# Patient Record
Sex: FEMALE | Race: WHITE | NOT HISPANIC OR LATINO | Employment: OTHER | URBAN - METROPOLITAN AREA
[De-identification: names, ages, dates, MRNs, and addresses within clinical notes are randomized per-mention and may not be internally consistent; named-entity substitution may affect disease eponyms.]

---

## 2022-07-10 ENCOUNTER — OFFICE VISIT (OUTPATIENT)
Dept: URGENT CARE | Facility: PHYSICIAN GROUP | Age: 82
End: 2022-07-10
Payer: MEDICARE

## 2022-07-10 VITALS
SYSTOLIC BLOOD PRESSURE: 124 MMHG | TEMPERATURE: 98.2 F | HEART RATE: 86 BPM | OXYGEN SATURATION: 97 % | RESPIRATION RATE: 16 BRPM | BODY MASS INDEX: 14.91 KG/M2 | DIASTOLIC BLOOD PRESSURE: 66 MMHG | HEIGHT: 67 IN | WEIGHT: 95 LBS

## 2022-07-10 DIAGNOSIS — U07.1 COVID-19 VIRUS INFECTION: ICD-10-CM

## 2022-07-10 LAB
EXTERNAL QUALITY CONTROL: ABNORMAL
SARS-COV+SARS-COV-2 AG RESP QL IA.RAPID: POSITIVE

## 2022-07-10 PROCEDURE — 99203 OFFICE O/P NEW LOW 30 MIN: CPT | Mod: CS | Performed by: FAMILY MEDICINE

## 2022-07-10 PROCEDURE — 87426 SARSCOV CORONAVIRUS AG IA: CPT | Performed by: FAMILY MEDICINE

## 2022-07-10 ASSESSMENT — ENCOUNTER SYMPTOMS
COUGH: 0
VOMITING: 0
ABDOMINAL PAIN: 0
FEVER: 0
SORE THROAT: 0
HEADACHES: 0
SHORTNESS OF BREATH: 0

## 2022-07-10 NOTE — PROGRESS NOTES
"Subjective:     Jacqueline Rodriguez is a 82 y.o. female who presents for Coronavirus Screening (Positive @ Home test. No symptoms at the moment wants covid test redone)    HPI  Pt presents for evaluation of an acute problem  Patient with positive COVID-19 testing at home  Patient's son recently tested positive for COVID-19 and they are living together  Patient currently asymptomatic, however daughter is concerned about the test  Wants repeat test and possible treatment if positive  Patient has no other acute complaints today    Review of Systems   Constitutional: Negative for fever.   HENT: Negative for sore throat.    Respiratory: Negative for cough and shortness of breath.    Gastrointestinal: Negative for abdominal pain and vomiting.   Skin: Negative for rash.   Neurological: Negative for headaches.       PMH:  has no past medical history on file.  MEDS: No current outpatient medications on file.  ALLERGIES: No Known Allergies  SURGHX: History reviewed. No pertinent surgical history.  SOCHX:  reports that she has never smoked. She has never used smokeless tobacco. She reports previous alcohol use. She reports that she does not use drugs.     Objective:   /66 (BP Location: Left arm, Patient Position: Sitting, BP Cuff Size: Adult)   Pulse 86   Temp 36.8 °C (98.2 °F) (Temporal)   Resp 16   Ht 1.702 m (5' 7\")   Wt 43.1 kg (95 lb) Comment: pt provided in wheelchair  SpO2 97%   BMI 14.88 kg/m²     Physical Exam  Constitutional:       General: She is not in acute distress.     Appearance: She is well-developed. She is not diaphoretic.   HENT:      Head: Normocephalic and atraumatic.      Nose: Nose normal.      Mouth/Throat:      Mouth: Mucous membranes are moist.      Pharynx: Oropharynx is clear. No oropharyngeal exudate or posterior oropharyngeal erythema.   Neck:      Trachea: No tracheal deviation.   Cardiovascular:      Rate and Rhythm: Normal rate and regular rhythm.   Pulmonary:      Effort: Pulmonary " effort is normal. No respiratory distress.      Breath sounds: Normal breath sounds. No wheezing or rales.   Musculoskeletal:      Cervical back: Normal range of motion and neck supple. No tenderness.   Lymphadenopathy:      Cervical: No cervical adenopathy.   Skin:     General: Skin is warm and dry.      Findings: No rash.   Neurological:      Mental Status: She is alert.         Assessment/Plan:   Assessment    1. COVID-19 virus infection  - POCT SARS-COV Antigen KAIN (Symptomatic only)  - Nirmatrelvir & Ritonavir 20 x 150 MG & 10 x 100MG Tablet Therapy Pack; Take 300 mg nirmatrelvir (two 150 mg tablets) with 100 mg  ritonavir (one 100 mg tablet) by mouth, with all three tablets taken together  twice daily for 5 days.  Dispense: 30 Each; Refill: 0    Patient with COVID-19 positive test result.  She is currently asymptomatic.  Due to her age and frailty, did recommend empiric treatment to prevent severe symptoms.  Daughter agreeable.  Last GFR in the 70s.  Treated with Paxlovid.  F/U in urgent care as needed.

## 2022-07-13 PROBLEM — F33.9 MAJOR DEPRESSION, RECURRENT, CHRONIC (HCC): Status: ACTIVE | Noted: 2022-07-13

## 2022-07-13 PROBLEM — E53.8 VITAMIN B 12 DEFICIENCY: Status: ACTIVE | Noted: 2022-07-13

## 2022-07-13 PROBLEM — R41.81 AGE-RELATED COGNITIVE DECLINE: Status: ACTIVE | Noted: 2022-07-13

## 2022-07-13 PROBLEM — Z20.1 H/O EXPOSURE TO TUBERCULOSIS: Status: ACTIVE | Noted: 2022-07-13

## 2022-07-13 PROBLEM — E55.9 VITAMIN D DEFICIENCY: Status: ACTIVE | Noted: 2022-07-13

## 2022-07-15 ENCOUNTER — HOSPITAL ENCOUNTER (OUTPATIENT)
Facility: MEDICAL CENTER | Age: 82
End: 2022-07-15
Attending: NURSE PRACTITIONER
Payer: MEDICARE

## 2022-07-15 DIAGNOSIS — Z20.1 H/O EXPOSURE TO TUBERCULOSIS: ICD-10-CM

## 2022-07-15 DIAGNOSIS — F33.9 MAJOR DEPRESSION, RECURRENT, CHRONIC (HCC): ICD-10-CM

## 2022-07-15 DIAGNOSIS — E53.8 VITAMIN B 12 DEFICIENCY: ICD-10-CM

## 2022-07-15 DIAGNOSIS — R41.81 AGE-RELATED COGNITIVE DECLINE: ICD-10-CM

## 2022-07-15 DIAGNOSIS — E55.9 VITAMIN D DEFICIENCY: ICD-10-CM

## 2022-07-15 PROCEDURE — 85025 COMPLETE CBC W/AUTO DIFF WBC: CPT

## 2022-07-15 PROCEDURE — 82306 VITAMIN D 25 HYDROXY: CPT

## 2022-07-15 PROCEDURE — 86480 TB TEST CELL IMMUN MEASURE: CPT

## 2022-07-15 PROCEDURE — 82607 VITAMIN B-12: CPT

## 2022-07-15 PROCEDURE — 80053 COMPREHEN METABOLIC PANEL: CPT

## 2022-07-16 LAB
25(OH)D3 SERPL-MCNC: 54 NG/ML (ref 30–100)
ALBUMIN SERPL BCP-MCNC: 4.4 G/DL (ref 3.2–4.9)
ALBUMIN/GLOB SERPL: 2.3 G/DL
ALP SERPL-CCNC: 76 U/L (ref 30–99)
ALT SERPL-CCNC: 16 U/L (ref 2–50)
ANION GAP SERPL CALC-SCNC: 12 MMOL/L (ref 7–16)
AST SERPL-CCNC: 21 U/L (ref 12–45)
BASOPHILS # BLD AUTO: 0.4 % (ref 0–1.8)
BASOPHILS # BLD: 0.03 K/UL (ref 0–0.12)
BILIRUB SERPL-MCNC: 0.4 MG/DL (ref 0.1–1.5)
BUN SERPL-MCNC: 31 MG/DL (ref 8–22)
CALCIUM SERPL-MCNC: 9.9 MG/DL (ref 8.5–10.5)
CHLORIDE SERPL-SCNC: 102 MMOL/L (ref 96–112)
CO2 SERPL-SCNC: 24 MMOL/L (ref 20–33)
CREAT SERPL-MCNC: 0.53 MG/DL (ref 0.5–1.4)
EOSINOPHIL # BLD AUTO: 0.07 K/UL (ref 0–0.51)
EOSINOPHIL NFR BLD: 1 % (ref 0–6.9)
ERYTHROCYTE [DISTWIDTH] IN BLOOD BY AUTOMATED COUNT: 53.1 FL (ref 35.9–50)
GFR SERPLBLD CREATININE-BSD FMLA CKD-EPI: 93 ML/MIN/1.73 M 2
GLOBULIN SER CALC-MCNC: 1.9 G/DL (ref 1.9–3.5)
GLUCOSE SERPL-MCNC: 104 MG/DL (ref 65–99)
HCT VFR BLD AUTO: 44.4 % (ref 37–47)
HGB BLD-MCNC: 13.9 G/DL (ref 12–16)
IMM GRANULOCYTES # BLD AUTO: 0.04 K/UL (ref 0–0.11)
IMM GRANULOCYTES NFR BLD AUTO: 0.6 % (ref 0–0.9)
LYMPHOCYTES # BLD AUTO: 0.94 K/UL (ref 1–4.8)
LYMPHOCYTES NFR BLD: 13 % (ref 22–41)
MCH RBC QN AUTO: 30.7 PG (ref 27–33)
MCHC RBC AUTO-ENTMCNC: 31.3 G/DL (ref 33.6–35)
MCV RBC AUTO: 98 FL (ref 81.4–97.8)
MONOCYTES # BLD AUTO: 0.63 K/UL (ref 0–0.85)
MONOCYTES NFR BLD AUTO: 8.7 % (ref 0–13.4)
NEUTROPHILS # BLD AUTO: 5.5 K/UL (ref 2–7.15)
NEUTROPHILS NFR BLD: 76.3 % (ref 44–72)
NRBC # BLD AUTO: 0 K/UL
NRBC BLD-RTO: 0 /100 WBC
PLATELET # BLD AUTO: 260 K/UL (ref 164–446)
PMV BLD AUTO: 10.9 FL (ref 9–12.9)
POTASSIUM SERPL-SCNC: 4 MMOL/L (ref 3.6–5.5)
PROT SERPL-MCNC: 6.3 G/DL (ref 6–8.2)
RBC # BLD AUTO: 4.53 M/UL (ref 4.2–5.4)
SODIUM SERPL-SCNC: 138 MMOL/L (ref 135–145)
VIT B12 SERPL-MCNC: 914 PG/ML (ref 211–911)
WBC # BLD AUTO: 7.2 K/UL (ref 4.8–10.8)

## 2022-07-18 LAB
GAMMA INTERFERON BACKGROUND BLD IA-ACNC: 0.07 IU/ML
M TB IFN-G BLD-IMP: NEGATIVE
M TB IFN-G CD4+ BCKGRND COR BLD-ACNC: -0.01 IU/ML
MITOGEN IGNF BCKGRD COR BLD-ACNC: 6.58 IU/ML
QFT TB2 - NIL TBQ2: -0.03 IU/ML

## 2022-07-22 PROBLEM — Z99.3 WHEELCHAIR BOUND: Status: ACTIVE | Noted: 2022-07-21

## 2022-07-22 PROBLEM — R13.19 OTHER DYSPHAGIA: Status: ACTIVE | Noted: 2022-07-21
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
## 2022-07-26 ENCOUNTER — HOSPITAL ENCOUNTER (OUTPATIENT)
Facility: MEDICAL CENTER | Age: 82
End: 2022-07-26
Attending: NURSE PRACTITIONER
Payer: MEDICARE

## 2022-07-26 DIAGNOSIS — R39.9 UTI SYMPTOMS: ICD-10-CM

## 2022-07-26 LAB
APPEARANCE UR: CLEAR
BACTERIA #/AREA URNS HPF: ABNORMAL /HPF
BILIRUB UR QL STRIP.AUTO: NEGATIVE
COLOR UR: YELLOW
EPI CELLS #/AREA URNS HPF: ABNORMAL /HPF
GLUCOSE UR STRIP.AUTO-MCNC: NEGATIVE MG/DL
KETONES UR STRIP.AUTO-MCNC: ABNORMAL MG/DL
LEUKOCYTE ESTERASE UR QL STRIP.AUTO: ABNORMAL
MICRO URNS: ABNORMAL
NITRITE UR QL STRIP.AUTO: POSITIVE
PH UR STRIP.AUTO: 6 [PH] (ref 5–8)
PROT UR QL STRIP: NEGATIVE MG/DL
RBC # URNS HPF: ABNORMAL /HPF
RBC UR QL AUTO: ABNORMAL
SP GR UR STRIP.AUTO: 1.02
UROBILINOGEN UR STRIP.AUTO-MCNC: 1 MG/DL
WBC #/AREA URNS HPF: ABNORMAL /HPF

## 2022-07-26 PROCEDURE — 87077 CULTURE AEROBIC IDENTIFY: CPT

## 2022-07-26 PROCEDURE — 87086 URINE CULTURE/COLONY COUNT: CPT

## 2022-07-26 PROCEDURE — 81001 URINALYSIS AUTO W/SCOPE: CPT

## 2022-07-26 PROCEDURE — 87186 SC STD MICRODIL/AGAR DIL: CPT

## 2022-07-28 LAB
BACTERIA UR CULT: ABNORMAL
BACTERIA UR CULT: ABNORMAL
SIGNIFICANT IND 70042: ABNORMAL
SITE SITE: ABNORMAL
SOURCE SOURCE: ABNORMAL

## 2022-09-14 PROBLEM — R64 CACHEXIA (HCC): Status: ACTIVE | Noted: 2022-08-31

## 2022-09-14 PROBLEM — E43 SEVERE PROTEIN-CALORIE MALNUTRITION (HCC): Status: ACTIVE | Noted: 2022-07-07

## 2022-11-19 PROBLEM — K08.9 POOR DENTITION: Status: ACTIVE | Noted: 2022-11-15

## 2022-12-22 ENCOUNTER — HOSPITAL ENCOUNTER (OUTPATIENT)
Facility: MEDICAL CENTER | Age: 82
End: 2022-12-22
Attending: NURSE PRACTITIONER
Payer: MEDICARE

## 2022-12-22 DIAGNOSIS — R39.9 UTI SYMPTOMS: ICD-10-CM

## 2022-12-22 PROCEDURE — 81001 URINALYSIS AUTO W/SCOPE: CPT

## 2022-12-22 PROCEDURE — 87086 URINE CULTURE/COLONY COUNT: CPT

## 2022-12-22 PROCEDURE — 87186 SC STD MICRODIL/AGAR DIL: CPT

## 2022-12-22 PROCEDURE — 87077 CULTURE AEROBIC IDENTIFY: CPT

## 2022-12-23 LAB
APPEARANCE UR: ABNORMAL
BACTERIA #/AREA URNS HPF: ABNORMAL /HPF
BILIRUB UR QL STRIP.AUTO: NEGATIVE
COLOR UR: YELLOW
EPI CELLS #/AREA URNS HPF: ABNORMAL /HPF
GLUCOSE UR STRIP.AUTO-MCNC: NEGATIVE MG/DL
GRAN CASTS #/AREA URNS LPF: ABNORMAL /LPF
HYALINE CASTS #/AREA URNS LPF: ABNORMAL /LPF
KETONES UR STRIP.AUTO-MCNC: NEGATIVE MG/DL
LEUKOCYTE ESTERASE UR QL STRIP.AUTO: ABNORMAL
MICRO URNS: ABNORMAL
NITRITE UR QL STRIP.AUTO: POSITIVE
PH UR STRIP.AUTO: 6 [PH] (ref 5–8)
PROT UR QL STRIP: NEGATIVE MG/DL
RBC # URNS HPF: ABNORMAL /HPF
RBC UR QL AUTO: ABNORMAL
SP GR UR STRIP.AUTO: 1.02
URATE CRY #/AREA URNS HPF: POSITIVE /HPF
UROBILINOGEN UR STRIP.AUTO-MCNC: 0.2 MG/DL
WBC #/AREA URNS HPF: ABNORMAL /HPF

## 2023-01-16 PROBLEM — N30.01 ACUTE CYSTITIS WITH HEMATURIA: Status: ACTIVE | Noted: 2023-01-16

## 2023-01-27 ENCOUNTER — HOSPITAL ENCOUNTER (OUTPATIENT)
Facility: MEDICAL CENTER | Age: 83
End: 2023-01-27
Attending: NURSE PRACTITIONER
Payer: MEDICARE

## 2023-01-27 DIAGNOSIS — N30.01 ACUTE CYSTITIS WITH HEMATURIA: ICD-10-CM

## 2023-01-27 PROCEDURE — 87086 URINE CULTURE/COLONY COUNT: CPT

## 2023-01-27 PROCEDURE — 87186 SC STD MICRODIL/AGAR DIL: CPT

## 2023-01-27 PROCEDURE — 87077 CULTURE AEROBIC IDENTIFY: CPT

## 2023-01-27 PROCEDURE — 81001 URINALYSIS AUTO W/SCOPE: CPT

## 2023-01-28 LAB
APPEARANCE UR: ABNORMAL
BACTERIA #/AREA URNS HPF: ABNORMAL /HPF
BILIRUB UR QL STRIP.AUTO: NEGATIVE
COLOR UR: YELLOW
EPI CELLS #/AREA URNS HPF: ABNORMAL /HPF
GLUCOSE UR STRIP.AUTO-MCNC: NEGATIVE MG/DL
HYALINE CASTS #/AREA URNS LPF: ABNORMAL /LPF
KETONES UR STRIP.AUTO-MCNC: NEGATIVE MG/DL
LEUKOCYTE ESTERASE UR QL STRIP.AUTO: ABNORMAL
MICRO URNS: ABNORMAL
NITRITE UR QL STRIP.AUTO: NEGATIVE
PH UR STRIP.AUTO: 7 [PH] (ref 5–8)
PROT UR QL STRIP: NEGATIVE MG/DL
RBC # URNS HPF: ABNORMAL /HPF
RBC UR QL AUTO: ABNORMAL
SP GR UR STRIP.AUTO: 1.02
UROBILINOGEN UR STRIP.AUTO-MCNC: 0.2 MG/DL
WBC #/AREA URNS HPF: ABNORMAL /HPF

## 2023-04-25 PROBLEM — N39.0 RECURRENT UTI: Status: ACTIVE | Noted: 2023-04-24

## 2023-06-05 PROBLEM — R29.898 WEAKNESS OF BOTH LOWER EXTREMITIES: Status: ACTIVE | Noted: 2023-06-05

## 2023-06-05 PROBLEM — R54 FRAILTY: Status: ACTIVE | Noted: 2023-06-05

## 2023-06-05 PROBLEM — R26.81 UNSTEADY GAIT: Status: ACTIVE | Noted: 2023-06-05

## 2023-06-05 PROBLEM — H54.7 VISION IMPAIRMENT: Status: ACTIVE | Noted: 2023-06-05

## 2023-06-15 ENCOUNTER — HOSPITAL ENCOUNTER (OUTPATIENT)
Facility: MEDICAL CENTER | Age: 83
End: 2023-06-15
Attending: NURSE PRACTITIONER
Payer: MEDICARE

## 2023-06-15 DIAGNOSIS — Z20.1 HISTORY OF TUBERCULOSIS EXPOSURE: ICD-10-CM

## 2023-06-15 DIAGNOSIS — R64 CACHEXIA (HCC): ICD-10-CM

## 2023-06-15 LAB
BASOPHILS # BLD AUTO: 0.5 % (ref 0–1.8)
BASOPHILS # BLD: 0.03 K/UL (ref 0–0.12)
EOSINOPHIL # BLD AUTO: 0.11 K/UL (ref 0–0.51)
EOSINOPHIL NFR BLD: 1.9 % (ref 0–6.9)
ERYTHROCYTE [DISTWIDTH] IN BLOOD BY AUTOMATED COUNT: 50.5 FL (ref 35.9–50)
HCT VFR BLD AUTO: 47.7 % (ref 37–47)
HGB BLD-MCNC: 15.4 G/DL (ref 12–16)
IMM GRANULOCYTES # BLD AUTO: 0.01 K/UL (ref 0–0.11)
IMM GRANULOCYTES NFR BLD AUTO: 0.2 % (ref 0–0.9)
LYMPHOCYTES # BLD AUTO: 1.09 K/UL (ref 1–4.8)
LYMPHOCYTES NFR BLD: 18.8 % (ref 22–41)
MCH RBC QN AUTO: 30.7 PG (ref 27–33)
MCHC RBC AUTO-ENTMCNC: 32.3 G/DL (ref 32.2–35.5)
MCV RBC AUTO: 95 FL (ref 81.4–97.8)
MONOCYTES # BLD AUTO: 0.52 K/UL (ref 0–0.85)
MONOCYTES NFR BLD AUTO: 9 % (ref 0–13.4)
NEUTROPHILS # BLD AUTO: 4.04 K/UL (ref 1.82–7.42)
NEUTROPHILS NFR BLD: 69.6 % (ref 44–72)
NRBC # BLD AUTO: 0 K/UL
NRBC BLD-RTO: 0 /100 WBC (ref 0–0.2)
PLATELET # BLD AUTO: 234 K/UL (ref 164–446)
PMV BLD AUTO: 11.2 FL (ref 9–12.9)
RBC # BLD AUTO: 5.02 M/UL (ref 4.2–5.4)
WBC # BLD AUTO: 5.8 K/UL (ref 4.8–10.8)

## 2023-06-15 PROCEDURE — 80053 COMPREHEN METABOLIC PANEL: CPT

## 2023-06-15 PROCEDURE — 85025 COMPLETE CBC W/AUTO DIFF WBC: CPT

## 2023-06-15 PROCEDURE — 82306 VITAMIN D 25 HYDROXY: CPT

## 2023-06-15 PROCEDURE — 82607 VITAMIN B-12: CPT

## 2023-06-15 PROCEDURE — 86480 TB TEST CELL IMMUN MEASURE: CPT

## 2023-06-16 LAB
ALBUMIN SERPL BCP-MCNC: 4.7 G/DL (ref 3.2–4.9)
ALBUMIN/GLOB SERPL: 2.2 G/DL
ALP SERPL-CCNC: 94 U/L (ref 30–99)
ALT SERPL-CCNC: 12 U/L (ref 2–50)
ANION GAP SERPL CALC-SCNC: 16 MMOL/L (ref 7–16)
AST SERPL-CCNC: 12 U/L (ref 12–45)
BILIRUB SERPL-MCNC: 0.5 MG/DL (ref 0.1–1.5)
BUN SERPL-MCNC: 20 MG/DL (ref 8–22)
CALCIUM ALBUM COR SERPL-MCNC: 9.4 MG/DL (ref 8.5–10.5)
CALCIUM SERPL-MCNC: 10 MG/DL (ref 8.5–10.5)
CHLORIDE SERPL-SCNC: 100 MMOL/L (ref 96–112)
CO2 SERPL-SCNC: 25 MMOL/L (ref 20–33)
CREAT SERPL-MCNC: 0.69 MG/DL (ref 0.5–1.4)
GFR SERPLBLD CREATININE-BSD FMLA CKD-EPI: 87 ML/MIN/1.73 M 2
GLOBULIN SER CALC-MCNC: 2.1 G/DL (ref 1.9–3.5)
GLUCOSE SERPL-MCNC: 70 MG/DL (ref 65–99)
POTASSIUM SERPL-SCNC: 4.1 MMOL/L (ref 3.6–5.5)
PROT SERPL-MCNC: 6.8 G/DL (ref 6–8.2)
SODIUM SERPL-SCNC: 141 MMOL/L (ref 135–145)
VIT B12 SERPL-MCNC: 713 PG/ML (ref 211–911)

## 2023-06-17 LAB — 25(OH)D3 SERPL-MCNC: 55 NG/ML (ref 30–100)

## 2023-06-19 LAB
GAMMA INTERFERON BACKGROUND BLD IA-ACNC: 0.05 IU/ML
M TB IFN-G BLD-IMP: NEGATIVE
M TB IFN-G CD4+ BCKGRND COR BLD-ACNC: -0.01 IU/ML
MITOGEN IGNF BCKGRD COR BLD-ACNC: 1.91 IU/ML
QFT TB2 - NIL TBQ2: -0.01 IU/ML

## 2023-08-07 PROBLEM — N63.13 MASS OF LOWER OUTER QUADRANT OF RIGHT BREAST: Status: ACTIVE | Noted: 2023-08-07

## 2023-08-07 PROBLEM — N63.10 LARGE MASS OF RIGHT BREAST: Status: ACTIVE | Noted: 2023-08-07

## 2023-08-07 PROBLEM — Z12.31 ENCOUNTER FOR SCREENING MAMMOGRAM FOR BREAST CANCER: Status: ACTIVE | Noted: 2023-08-07

## 2023-08-07 PROBLEM — S21.001A BREAST WOUND, RIGHT, INITIAL ENCOUNTER: Status: ACTIVE | Noted: 2023-08-07

## 2023-08-21 PROBLEM — H61.23 HEARING LOSS DUE TO CERUMEN IMPACTION, BILATERAL: Status: ACTIVE | Noted: 2023-08-21

## 2023-09-10 PROBLEM — S21.001S: Status: ACTIVE | Noted: 2023-08-07

## 2023-09-11 PROBLEM — C50.911 BREAST CANCER METASTASIZED TO BONE, RIGHT (HCC): Status: ACTIVE | Noted: 2023-09-11

## 2023-09-11 PROBLEM — C79.51 BREAST CANCER METASTASIZED TO BONE, RIGHT (HCC): Status: ACTIVE | Noted: 2023-09-11

## 2023-09-14 PROBLEM — H61.21 HEARING LOSS OF RIGHT EAR DUE TO CERUMEN IMPACTION: Status: ACTIVE | Noted: 2023-08-21

## 2025-03-11 ENCOUNTER — APPOINTMENT (OUTPATIENT)
Dept: RADIOLOGY | Facility: MEDICAL CENTER | Age: 85
DRG: 480 | End: 2025-03-11
Attending: STUDENT IN AN ORGANIZED HEALTH CARE EDUCATION/TRAINING PROGRAM
Payer: MEDICARE

## 2025-03-11 ENCOUNTER — APPOINTMENT (OUTPATIENT)
Dept: RADIOLOGY | Facility: MEDICAL CENTER | Age: 85
DRG: 480 | End: 2025-03-11
Attending: EMERGENCY MEDICINE
Payer: MEDICARE

## 2025-03-11 ENCOUNTER — ANESTHESIA (OUTPATIENT)
Dept: SURGERY | Facility: MEDICAL CENTER | Age: 85
DRG: 480 | End: 2025-03-11
Payer: MEDICARE

## 2025-03-11 ENCOUNTER — ANESTHESIA EVENT (OUTPATIENT)
Dept: SURGERY | Facility: MEDICAL CENTER | Age: 85
DRG: 480 | End: 2025-03-11
Payer: MEDICARE

## 2025-03-11 ENCOUNTER — HOSPITAL ENCOUNTER (INPATIENT)
Facility: MEDICAL CENTER | Age: 85
LOS: 1 days | DRG: 480 | End: 2025-03-12
Attending: EMERGENCY MEDICINE | Admitting: INTERNAL MEDICINE
Payer: MEDICARE

## 2025-03-11 DIAGNOSIS — C50.911 BREAST CANCER METASTASIZED TO BONE, RIGHT (HCC): ICD-10-CM
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
## 2025-03-11 DIAGNOSIS — C50.919 MALIGNANT NEOPLASM OF FEMALE BREAST, UNSPECIFIED ESTROGEN RECEPTOR STATUS, UNSPECIFIED LATERALITY, UNSPECIFIED SITE OF BREAST (HCC): ICD-10-CM

## 2025-03-11 DIAGNOSIS — S72.91XA CLOSED FRACTURE OF RIGHT FEMUR, UNSPECIFIED FRACTURE MORPHOLOGY, UNSPECIFIED PORTION OF FEMUR, INITIAL ENCOUNTER (HCC): ICD-10-CM

## 2025-03-11 DIAGNOSIS — C79.51 BREAST CANCER METASTASIZED TO BONE, RIGHT (HCC): ICD-10-CM

## 2025-03-11 DIAGNOSIS — R64 CACHEXIA (HCC): ICD-10-CM

## 2025-03-11 PROBLEM — S72.331A CLOSED DISPLACED OBLIQUE FRACTURE OF SHAFT OF RIGHT FEMUR (HCC): Status: ACTIVE | Noted: 2025-03-11

## 2025-03-11 PROBLEM — S72.8X9A: Status: ACTIVE | Noted: 2025-03-11

## 2025-03-11 LAB
ALBUMIN SERPL BCP-MCNC: 3.7 G/DL (ref 3.2–4.9)
ALBUMIN/GLOB SERPL: 1.4 G/DL
ALP SERPL-CCNC: 81 U/L (ref 30–99)
ALT SERPL-CCNC: 10 U/L (ref 2–50)
ANION GAP SERPL CALC-SCNC: 9 MMOL/L (ref 7–16)
APTT PPP: 27.4 SEC (ref 24.7–36)
AST SERPL-CCNC: 17 U/L (ref 12–45)
BASOPHILS # BLD AUTO: 0.4 % (ref 0–1.8)
BASOPHILS # BLD: 0.03 K/UL (ref 0–0.12)
BILIRUB SERPL-MCNC: 0.3 MG/DL (ref 0.1–1.5)
BUN SERPL-MCNC: 27 MG/DL (ref 8–22)
CALCIUM ALBUM COR SERPL-MCNC: 9.4 MG/DL (ref 8.5–10.5)
CALCIUM SERPL-MCNC: 9.2 MG/DL (ref 8.5–10.5)
CHLORIDE SERPL-SCNC: 106 MMOL/L (ref 96–112)
CO2 SERPL-SCNC: 24 MMOL/L (ref 20–33)
CREAT SERPL-MCNC: 0.8 MG/DL (ref 0.5–1.4)
EKG IMPRESSION: NORMAL
EOSINOPHIL # BLD AUTO: 0.06 K/UL (ref 0–0.51)
EOSINOPHIL NFR BLD: 0.8 % (ref 0–6.9)
ERYTHROCYTE [DISTWIDTH] IN BLOOD BY AUTOMATED COUNT: 48.9 FL (ref 35.9–50)
GFR SERPLBLD CREATININE-BSD FMLA CKD-EPI: 73 ML/MIN/1.73 M 2
GLOBULIN SER CALC-MCNC: 2.6 G/DL (ref 1.9–3.5)
GLUCOSE SERPL-MCNC: 132 MG/DL (ref 65–99)
HCT VFR BLD AUTO: 38.7 % (ref 37–47)
HGB BLD-MCNC: 12.7 G/DL (ref 12–16)
IMM GRANULOCYTES # BLD AUTO: 0.03 K/UL (ref 0–0.11)
IMM GRANULOCYTES NFR BLD AUTO: 0.4 % (ref 0–0.9)
INR PPP: 1.26 (ref 0.87–1.13)
LYMPHOCYTES # BLD AUTO: 0.7 K/UL (ref 1–4.8)
LYMPHOCYTES NFR BLD: 9.1 % (ref 22–41)
MCH RBC QN AUTO: 31.8 PG (ref 27–33)
MCHC RBC AUTO-ENTMCNC: 32.8 G/DL (ref 32.2–35.5)
MCV RBC AUTO: 96.8 FL (ref 81.4–97.8)
MONOCYTES # BLD AUTO: 0.65 K/UL (ref 0–0.85)
MONOCYTES NFR BLD AUTO: 8.5 % (ref 0–13.4)
NEUTROPHILS # BLD AUTO: 6.22 K/UL (ref 1.82–7.42)
NEUTROPHILS NFR BLD: 80.8 % (ref 44–72)
NRBC # BLD AUTO: 0 K/UL
NRBC BLD-RTO: 0 /100 WBC (ref 0–0.2)
PLATELET # BLD AUTO: 233 K/UL (ref 164–446)
PMV BLD AUTO: 10.6 FL (ref 9–12.9)
POTASSIUM SERPL-SCNC: 3.6 MMOL/L (ref 3.6–5.5)
PROT SERPL-MCNC: 6.3 G/DL (ref 6–8.2)
PROTHROMBIN TIME: 15.8 SEC (ref 12–14.6)
RBC # BLD AUTO: 4 M/UL (ref 4.2–5.4)
SODIUM SERPL-SCNC: 139 MMOL/L (ref 135–145)
WBC # BLD AUTO: 7.7 K/UL (ref 4.8–10.8)

## 2025-03-11 PROCEDURE — 700111 HCHG RX REV CODE 636 W/ 250 OVERRIDE (IP): Mod: JZ | Performed by: EMERGENCY MEDICINE

## 2025-03-11 PROCEDURE — 85025 COMPLETE CBC W/AUTO DIFF WBC: CPT

## 2025-03-11 PROCEDURE — 700101 HCHG RX REV CODE 250: Performed by: ANESTHESIOLOGY

## 2025-03-11 PROCEDURE — 99497 ADVNCD CARE PLAN 30 MIN: CPT | Performed by: INTERNAL MEDICINE

## 2025-03-11 PROCEDURE — 93005 ELECTROCARDIOGRAM TRACING: CPT | Mod: TC | Performed by: EMERGENCY MEDICINE

## 2025-03-11 PROCEDURE — C1713 ANCHOR/SCREW BN/BN,TIS/BN: HCPCS | Performed by: STUDENT IN AN ORGANIZED HEALTH CARE EDUCATION/TRAINING PROGRAM

## 2025-03-11 PROCEDURE — 99222 1ST HOSP IP/OBS MODERATE 55: CPT | Mod: 57 | Performed by: STUDENT IN AN ORGANIZED HEALTH CARE EDUCATION/TRAINING PROGRAM

## 2025-03-11 PROCEDURE — 160029 HCHG SURGERY MINUTES - 1ST 30 MINS LEVEL 4: Performed by: STUDENT IN AN ORGANIZED HEALTH CARE EDUCATION/TRAINING PROGRAM

## 2025-03-11 PROCEDURE — 36415 COLL VENOUS BLD VENIPUNCTURE: CPT

## 2025-03-11 PROCEDURE — 160041 HCHG SURGERY MINUTES - EA ADDL 1 MIN LEVEL 4: Performed by: STUDENT IN AN ORGANIZED HEALTH CARE EDUCATION/TRAINING PROGRAM

## 2025-03-11 PROCEDURE — 73552 X-RAY EXAM OF FEMUR 2/>: CPT | Mod: RT

## 2025-03-11 PROCEDURE — 700117 HCHG RX CONTRAST REV CODE 255: Performed by: EMERGENCY MEDICINE

## 2025-03-11 PROCEDURE — 85730 THROMBOPLASTIN TIME PARTIAL: CPT

## 2025-03-11 PROCEDURE — 700111 HCHG RX REV CODE 636 W/ 250 OVERRIDE (IP): Mod: JZ

## 2025-03-11 PROCEDURE — 770001 HCHG ROOM/CARE - MED/SURG/GYN PRIV*

## 2025-03-11 PROCEDURE — 72125 CT NECK SPINE W/O DYE: CPT

## 2025-03-11 PROCEDURE — 96374 THER/PROPH/DIAG INJ IV PUSH: CPT

## 2025-03-11 PROCEDURE — 27510 TREATMENT OF THIGH FRACTURE: CPT

## 2025-03-11 PROCEDURE — 160002 HCHG RECOVERY MINUTES (STAT): Performed by: STUDENT IN AN ORGANIZED HEALTH CARE EDUCATION/TRAINING PROGRAM

## 2025-03-11 PROCEDURE — 27506 TREATMENT OF THIGH FRACTURE: CPT | Mod: 80ROC,RT | Performed by: STUDENT IN AN ORGANIZED HEALTH CARE EDUCATION/TRAINING PROGRAM

## 2025-03-11 PROCEDURE — 0QS806Z REPOSITION RIGHT FEMORAL SHAFT WITH INTRAMEDULLARY INTERNAL FIXATION DEVICE, OPEN APPROACH: ICD-10-PCS | Performed by: STUDENT IN AN ORGANIZED HEALTH CARE EDUCATION/TRAINING PROGRAM

## 2025-03-11 PROCEDURE — 99291 CRITICAL CARE FIRST HOUR: CPT

## 2025-03-11 PROCEDURE — 502000 HCHG MISC OR IMPLANTS RC 0278: Performed by: STUDENT IN AN ORGANIZED HEALTH CARE EDUCATION/TRAINING PROGRAM

## 2025-03-11 PROCEDURE — 0QS8XZZ REPOSITION RIGHT FEMORAL SHAFT, EXTERNAL APPROACH: ICD-10-PCS | Performed by: EMERGENCY MEDICINE

## 2025-03-11 PROCEDURE — 73551 X-RAY EXAM OF FEMUR 1: CPT | Mod: RT

## 2025-03-11 PROCEDURE — 160048 HCHG OR STATISTICAL LEVEL 1-5: Performed by: STUDENT IN AN ORGANIZED HEALTH CARE EDUCATION/TRAINING PROGRAM

## 2025-03-11 PROCEDURE — 73706 CT ANGIO LWR EXTR W/O&W/DYE: CPT | Mod: RT

## 2025-03-11 PROCEDURE — 70450 CT HEAD/BRAIN W/O DYE: CPT

## 2025-03-11 PROCEDURE — 85610 PROTHROMBIN TIME: CPT

## 2025-03-11 PROCEDURE — 700105 HCHG RX REV CODE 258

## 2025-03-11 PROCEDURE — 160009 HCHG ANES TIME/MIN: Performed by: STUDENT IN AN ORGANIZED HEALTH CARE EDUCATION/TRAINING PROGRAM

## 2025-03-11 PROCEDURE — 700101 HCHG RX REV CODE 250: Performed by: EMERGENCY MEDICINE

## 2025-03-11 PROCEDURE — 71045 X-RAY EXAM CHEST 1 VIEW: CPT

## 2025-03-11 PROCEDURE — 80053 COMPREHEN METABOLIC PANEL: CPT

## 2025-03-11 PROCEDURE — 94799 UNLISTED PULMONARY SVC/PX: CPT

## 2025-03-11 PROCEDURE — 96375 TX/PRO/DX INJ NEW DRUG ADDON: CPT

## 2025-03-11 PROCEDURE — 99223 1ST HOSP IP/OBS HIGH 75: CPT | Mod: 25,GC,AI | Performed by: INTERNAL MEDICINE

## 2025-03-11 PROCEDURE — 160015 HCHG STAT PREOP MINUTES: Performed by: STUDENT IN AN ORGANIZED HEALTH CARE EDUCATION/TRAINING PROGRAM

## 2025-03-11 PROCEDURE — 700105 HCHG RX REV CODE 258: Performed by: ANESTHESIOLOGY

## 2025-03-11 PROCEDURE — 160035 HCHG PACU - 1ST 60 MINS PHASE I: Performed by: STUDENT IN AN ORGANIZED HEALTH CARE EDUCATION/TRAINING PROGRAM

## 2025-03-11 PROCEDURE — 700111 HCHG RX REV CODE 636 W/ 250 OVERRIDE (IP): Performed by: ANESTHESIOLOGY

## 2025-03-11 PROCEDURE — 27506 TREATMENT OF THIGH FRACTURE: CPT | Mod: RT | Performed by: STUDENT IN AN ORGANIZED HEALTH CARE EDUCATION/TRAINING PROGRAM

## 2025-03-11 DEVICE — ADVANCED LOCKING SCREW 5X65MM: Type: IMPLANTABLE DEVICE | Site: LEG | Status: FUNCTIONAL

## 2025-03-11 DEVICE — IMPLANTABLE DEVICE: Type: IMPLANTABLE DEVICE | Site: LEG | Status: FUNCTIONAL

## 2025-03-11 DEVICE — ADVANCED LOCKING SCREW 5X45MM: Type: IMPLANTABLE DEVICE | Site: LEG | Status: FUNCTIONAL

## 2025-03-11 DEVICE — LOCKING SCREW DIA 5X40MM: Type: IMPLANTABLE DEVICE | Site: LEG | Status: FUNCTIONAL

## 2025-03-11 DEVICE — ADVANCED LOCKING SCREW 5X75MM: Type: IMPLANTABLE DEVICE | Site: LEG | Status: FUNCTIONAL

## 2025-03-11 DEVICE — ADVANCED LOCKING SCREW 5X60MM: Type: IMPLANTABLE DEVICE | Site: LEG | Status: FUNCTIONAL

## 2025-03-11 RX ORDER — LIDOCAINE HYDROCHLORIDE 40 MG/ML
SOLUTION TOPICAL PRN
Status: DISCONTINUED | OUTPATIENT
Start: 2025-03-11 | End: 2025-03-11 | Stop reason: SURG

## 2025-03-11 RX ORDER — MIDAZOLAM HYDROCHLORIDE 1 MG/ML
1 INJECTION INTRAMUSCULAR; INTRAVENOUS ONCE
Status: DISCONTINUED | OUTPATIENT
Start: 2025-03-11 | End: 2025-03-11

## 2025-03-11 RX ORDER — CEFAZOLIN SODIUM 1 G/3ML
INJECTION, POWDER, FOR SOLUTION INTRAMUSCULAR; INTRAVENOUS PRN
Status: DISCONTINUED | OUTPATIENT
Start: 2025-03-11 | End: 2025-03-11 | Stop reason: SURG

## 2025-03-11 RX ORDER — OXYCODONE HYDROCHLORIDE 5 MG/1
5 TABLET ORAL EVERY 4 HOURS PRN
Refills: 0 | Status: DISCONTINUED | OUTPATIENT
Start: 2025-03-11 | End: 2025-03-12

## 2025-03-11 RX ORDER — SODIUM CHLORIDE, SODIUM LACTATE, POTASSIUM CHLORIDE, CALCIUM CHLORIDE 600; 310; 30; 20 MG/100ML; MG/100ML; MG/100ML; MG/100ML
INJECTION, SOLUTION INTRAVENOUS
Status: DISCONTINUED | OUTPATIENT
Start: 2025-03-11 | End: 2025-03-11 | Stop reason: SURG

## 2025-03-11 RX ORDER — DIPHENHYDRAMINE HYDROCHLORIDE 50 MG/ML
12.5 INJECTION, SOLUTION INTRAMUSCULAR; INTRAVENOUS
Status: DISCONTINUED | OUTPATIENT
Start: 2025-03-11 | End: 2025-03-11 | Stop reason: HOSPADM

## 2025-03-11 RX ORDER — PHENYLEPHRINE HYDROCHLORIDE 10 MG/ML
INJECTION, SOLUTION INTRAMUSCULAR; INTRAVENOUS; SUBCUTANEOUS PRN
Status: DISCONTINUED | OUTPATIENT
Start: 2025-03-11 | End: 2025-03-11 | Stop reason: SURG

## 2025-03-11 RX ORDER — OXYCODONE HCL 5 MG/5 ML
5 SOLUTION, ORAL ORAL
Status: DISCONTINUED | OUTPATIENT
Start: 2025-03-11 | End: 2025-03-11 | Stop reason: HOSPADM

## 2025-03-11 RX ORDER — SUCCINYLCHOLINE CHLORIDE 20 MG/ML
INJECTION INTRAMUSCULAR; INTRAVENOUS PRN
Status: DISCONTINUED | OUTPATIENT
Start: 2025-03-11 | End: 2025-03-11 | Stop reason: SURG

## 2025-03-11 RX ORDER — ONDANSETRON 2 MG/ML
4 INJECTION INTRAMUSCULAR; INTRAVENOUS EVERY 4 HOURS PRN
Status: DISCONTINUED | OUTPATIENT
Start: 2025-03-11 | End: 2025-03-12

## 2025-03-11 RX ORDER — ACETAMINOPHEN 325 MG/1
650 TABLET ORAL EVERY 4 HOURS PRN
Status: DISCONTINUED | OUTPATIENT
Start: 2025-03-11 | End: 2025-03-12 | Stop reason: HOSPADM

## 2025-03-11 RX ORDER — TRAMADOL HYDROCHLORIDE 50 MG/1
50 TABLET ORAL EVERY 6 HOURS PRN
COMMUNITY

## 2025-03-11 RX ORDER — ALBUTEROL SULFATE 5 MG/ML
2.5 SOLUTION RESPIRATORY (INHALATION)
Status: DISCONTINUED | OUTPATIENT
Start: 2025-03-11 | End: 2025-03-11 | Stop reason: HOSPADM

## 2025-03-11 RX ORDER — SODIUM CHLORIDE, SODIUM LACTATE, POTASSIUM CHLORIDE, AND CALCIUM CHLORIDE .6; .31; .03; .02 G/100ML; G/100ML; G/100ML; G/100ML
500 INJECTION, SOLUTION INTRAVENOUS ONCE
Status: COMPLETED | OUTPATIENT
Start: 2025-03-11 | End: 2025-03-11

## 2025-03-11 RX ORDER — HYDROMORPHONE HYDROCHLORIDE 1 MG/ML
0.4 INJECTION, SOLUTION INTRAMUSCULAR; INTRAVENOUS; SUBCUTANEOUS
Status: DISCONTINUED | OUTPATIENT
Start: 2025-03-11 | End: 2025-03-11 | Stop reason: HOSPADM

## 2025-03-11 RX ORDER — DEXAMETHASONE SODIUM PHOSPHATE 4 MG/ML
INJECTION, SOLUTION INTRA-ARTICULAR; INTRALESIONAL; INTRAMUSCULAR; INTRAVENOUS; SOFT TISSUE PRN
Status: DISCONTINUED | OUTPATIENT
Start: 2025-03-11 | End: 2025-03-11 | Stop reason: SURG

## 2025-03-11 RX ORDER — ONDANSETRON 2 MG/ML
4 INJECTION INTRAMUSCULAR; INTRAVENOUS
Status: DISCONTINUED | OUTPATIENT
Start: 2025-03-11 | End: 2025-03-11 | Stop reason: HOSPADM

## 2025-03-11 RX ORDER — ONDANSETRON 2 MG/ML
INJECTION INTRAMUSCULAR; INTRAVENOUS PRN
Status: DISCONTINUED | OUTPATIENT
Start: 2025-03-11 | End: 2025-03-11 | Stop reason: SURG

## 2025-03-11 RX ORDER — ROCURONIUM BROMIDE 10 MG/ML
INJECTION, SOLUTION INTRAVENOUS PRN
Status: DISCONTINUED | OUTPATIENT
Start: 2025-03-11 | End: 2025-03-11 | Stop reason: SURG

## 2025-03-11 RX ORDER — POLYETHYLENE GLYCOL 3350 17 G/17G
1 POWDER, FOR SOLUTION ORAL
Status: DISCONTINUED | OUTPATIENT
Start: 2025-03-11 | End: 2025-03-12 | Stop reason: HOSPADM

## 2025-03-11 RX ORDER — HALOPERIDOL 0.5 MG/1
0.5 TABLET ORAL EVERY 6 HOURS PRN
COMMUNITY

## 2025-03-11 RX ORDER — OXYCODONE HCL 5 MG/5 ML
10 SOLUTION, ORAL ORAL
Status: DISCONTINUED | OUTPATIENT
Start: 2025-03-11 | End: 2025-03-11 | Stop reason: HOSPADM

## 2025-03-11 RX ORDER — AMOXICILLIN 250 MG
2 CAPSULE ORAL EVERY EVENING
Status: DISCONTINUED | OUTPATIENT
Start: 2025-03-11 | End: 2025-03-12 | Stop reason: HOSPADM

## 2025-03-11 RX ORDER — MIDAZOLAM HYDROCHLORIDE 1 MG/ML
INJECTION INTRAMUSCULAR; INTRAVENOUS
Status: COMPLETED | OUTPATIENT
Start: 2025-03-11 | End: 2025-03-11

## 2025-03-11 RX ORDER — NALOXONE HYDROCHLORIDE 0.4 MG/ML
INJECTION, SOLUTION INTRAMUSCULAR; INTRAVENOUS; SUBCUTANEOUS
Status: DISCONTINUED
Start: 2025-03-11 | End: 2025-03-11

## 2025-03-11 RX ORDER — MORPHINE SULFATE 4 MG/ML
3 INJECTION INTRAVENOUS EVERY 4 HOURS PRN
Status: DISCONTINUED | OUTPATIENT
Start: 2025-03-11 | End: 2025-03-12

## 2025-03-11 RX ORDER — MORPHINE SULFATE 15 MG/1
7.5 TABLET ORAL EVERY 4 HOURS PRN
COMMUNITY

## 2025-03-11 RX ORDER — HYDROMORPHONE HYDROCHLORIDE 1 MG/ML
0.1 INJECTION, SOLUTION INTRAMUSCULAR; INTRAVENOUS; SUBCUTANEOUS
Status: DISCONTINUED | OUTPATIENT
Start: 2025-03-11 | End: 2025-03-11 | Stop reason: HOSPADM

## 2025-03-11 RX ORDER — ONDANSETRON 4 MG/1
4 TABLET, ORALLY DISINTEGRATING ORAL EVERY 4 HOURS PRN
Status: DISCONTINUED | OUTPATIENT
Start: 2025-03-11 | End: 2025-03-12

## 2025-03-11 RX ORDER — OXYCODONE HYDROCHLORIDE 5 MG/1
5 TABLET ORAL EVERY 4 HOURS
Refills: 0 | Status: DISCONTINUED | OUTPATIENT
Start: 2025-03-11 | End: 2025-03-11

## 2025-03-11 RX ORDER — HYDROMORPHONE HYDROCHLORIDE 1 MG/ML
0.2 INJECTION, SOLUTION INTRAMUSCULAR; INTRAVENOUS; SUBCUTANEOUS
Status: DISCONTINUED | OUTPATIENT
Start: 2025-03-11 | End: 2025-03-11 | Stop reason: HOSPADM

## 2025-03-11 RX ORDER — SODIUM CHLORIDE, SODIUM LACTATE, POTASSIUM CHLORIDE, CALCIUM CHLORIDE 600; 310; 30; 20 MG/100ML; MG/100ML; MG/100ML; MG/100ML
INJECTION, SOLUTION INTRAVENOUS CONTINUOUS
Status: DISCONTINUED | OUTPATIENT
Start: 2025-03-11 | End: 2025-03-11 | Stop reason: HOSPADM

## 2025-03-11 RX ADMIN — FENTANYL CITRATE 100 MCG: 50 INJECTION, SOLUTION INTRAMUSCULAR; INTRAVENOUS at 16:50

## 2025-03-11 RX ADMIN — SUGAMMADEX 100 MG: 100 INJECTION, SOLUTION INTRAVENOUS at 16:48

## 2025-03-11 RX ADMIN — MORPHINE SULFATE 3 MG: 4 INJECTION, SOLUTION INTRAMUSCULAR; INTRAVENOUS at 20:41

## 2025-03-11 RX ADMIN — KETAMINE HYDROCHLORIDE 50 MG: 50 INJECTION INTRAMUSCULAR; INTRAVENOUS at 12:03

## 2025-03-11 RX ADMIN — ONDANSETRON 4 MG: 2 INJECTION INTRAMUSCULAR; INTRAVENOUS at 16:42

## 2025-03-11 RX ADMIN — DEXAMETHASONE SODIUM PHOSPHATE 4 MG: 4 INJECTION INTRA-ARTICULAR; INTRALESIONAL; INTRAMUSCULAR; INTRAVENOUS; SOFT TISSUE at 15:37

## 2025-03-11 RX ADMIN — SODIUM CHLORIDE, POTASSIUM CHLORIDE, SODIUM LACTATE AND CALCIUM CHLORIDE: 600; 310; 30; 20 INJECTION, SOLUTION INTRAVENOUS at 15:24

## 2025-03-11 RX ADMIN — CEFAZOLIN 2 G: 1 INJECTION, POWDER, FOR SOLUTION INTRAMUSCULAR; INTRAVENOUS at 15:31

## 2025-03-11 RX ADMIN — SUCCINYLCHOLINE CHLORIDE 60 MG: 20 INJECTION, SOLUTION INTRAMUSCULAR; INTRAVENOUS at 15:28

## 2025-03-11 RX ADMIN — MIDAZOLAM HYDROCHLORIDE 1 MG: 1 INJECTION, SOLUTION INTRAMUSCULAR; INTRAVENOUS at 12:03

## 2025-03-11 RX ADMIN — PROPOFOL 100 MG: 10 INJECTION, EMULSION INTRAVENOUS at 15:28

## 2025-03-11 RX ADMIN — PHENYLEPHRINE HYDROCHLORIDE 200 MCG: 10 INJECTION INTRAVENOUS at 15:31

## 2025-03-11 RX ADMIN — FENTANYL CITRATE 50 MCG: 50 INJECTION, SOLUTION INTRAMUSCULAR; INTRAVENOUS at 13:48

## 2025-03-11 RX ADMIN — LIDOCAINE HYDROCHLORIDE 4 ML: 40 SOLUTION TOPICAL at 15:29

## 2025-03-11 RX ADMIN — ROCURONIUM BROMIDE 20 MG: 10 INJECTION INTRAVENOUS at 15:34

## 2025-03-11 RX ADMIN — SODIUM CHLORIDE, POTASSIUM CHLORIDE, SODIUM LACTATE AND CALCIUM CHLORIDE 500 ML: 600; 310; 30; 20 INJECTION, SOLUTION INTRAVENOUS at 21:05

## 2025-03-11 RX ADMIN — IOHEXOL 100 ML: 350 INJECTION, SOLUTION INTRAVENOUS at 13:30

## 2025-03-11 SDOH — ECONOMIC STABILITY: TRANSPORTATION INSECURITY
IN THE PAST 12 MONTHS, HAS LACK OF RELIABLE TRANSPORTATION KEPT YOU FROM MEDICAL APPOINTMENTS, MEETINGS, WORK OR FROM GETTING THINGS NEEDED FOR DAILY LIVING?: NO

## 2025-03-11 SDOH — ECONOMIC STABILITY: TRANSPORTATION INSECURITY
IN THE PAST 12 MONTHS, HAS THE LACK OF TRANSPORTATION KEPT YOU FROM MEDICAL APPOINTMENTS OR FROM GETTING MEDICATIONS?: NO

## 2025-03-11 ASSESSMENT — LIFESTYLE VARIABLES
ON A TYPICAL DAY WHEN YOU DRINK ALCOHOL HOW MANY DRINKS DO YOU HAVE: 0
HAVE PEOPLE ANNOYED YOU BY CRITICIZING YOUR DRINKING: NO
TOTAL SCORE: 0
AVERAGE NUMBER OF DAYS PER WEEK YOU HAVE A DRINK CONTAINING ALCOHOL: 0
TOTAL SCORE: 0
TOTAL SCORE: 0
ALCOHOL_USE: NO
EVER FELT BAD OR GUILTY ABOUT YOUR DRINKING: NO
HAVE YOU EVER FELT YOU SHOULD CUT DOWN ON YOUR DRINKING: NO
HOW MANY TIMES IN THE PAST YEAR HAVE YOU HAD 5 OR MORE DRINKS IN A DAY: 0
EVER HAD A DRINK FIRST THING IN THE MORNING TO STEADY YOUR NERVES TO GET RID OF A HANGOVER: NO
CONSUMPTION TOTAL: NEGATIVE

## 2025-03-11 ASSESSMENT — PAIN DESCRIPTION - PAIN TYPE: TYPE: ACUTE PAIN

## 2025-03-11 ASSESSMENT — COGNITIVE AND FUNCTIONAL STATUS - GENERAL
MOBILITY SCORE: 9
SUGGESTED CMS G CODE MODIFIER MOBILITY: CM
MOVING TO AND FROM BED TO CHAIR: A LOT
DRESSING REGULAR UPPER BODY CLOTHING: A LOT
SUGGESTED CMS G CODE MODIFIER DAILY ACTIVITY: CL
EATING MEALS: A LITTLE
HELP NEEDED FOR BATHING: A LOT
DRESSING REGULAR LOWER BODY CLOTHING: A LOT
WALKING IN HOSPITAL ROOM: TOTAL
CLIMB 3 TO 5 STEPS WITH RAILING: TOTAL
MOVING FROM LYING ON BACK TO SITTING ON SIDE OF FLAT BED: A LOT
TURNING FROM BACK TO SIDE WHILE IN FLAT BAD: A LOT
DAILY ACTIVITIY SCORE: 13
PERSONAL GROOMING: A LOT
STANDING UP FROM CHAIR USING ARMS: TOTAL
TOILETING: A LOT

## 2025-03-11 ASSESSMENT — SOCIAL DETERMINANTS OF HEALTH (SDOH)
WITHIN THE PAST 12 MONTHS, THE FOOD YOU BOUGHT JUST DIDN'T LAST AND YOU DIDN'T HAVE MONEY TO GET MORE: NEVER TRUE
WITHIN THE LAST YEAR, HAVE YOU BEEN KICKED, HIT, SLAPPED, OR OTHERWISE PHYSICALLY HURT BY YOUR PARTNER OR EX-PARTNER?: PATIENT UNABLE TO ANSWER
WITHIN THE LAST YEAR, HAVE YOU BEEN AFRAID OF YOUR PARTNER OR EX-PARTNER?: PATIENT UNABLE TO ANSWER
IN THE PAST 12 MONTHS, HAS THE ELECTRIC, GAS, OIL, OR WATER COMPANY THREATENED TO SHUT OFF SERVICE IN YOUR HOME?: NO
WITHIN THE LAST YEAR, HAVE TO BEEN RAPED OR FORCED TO HAVE ANY KIND OF SEXUAL ACTIVITY BY YOUR PARTNER OR EX-PARTNER?: PATIENT UNABLE TO ANSWER
WITHIN THE LAST YEAR, HAVE YOU BEEN HUMILIATED OR EMOTIONALLY ABUSED IN OTHER WAYS BY YOUR PARTNER OR EX-PARTNER?: PATIENT UNABLE TO ANSWER
WITHIN THE PAST 12 MONTHS, YOU WORRIED THAT YOUR FOOD WOULD RUN OUT BEFORE YOU GOT THE MONEY TO BUY MORE: NEVER TRUE

## 2025-03-11 ASSESSMENT — FIBROSIS 4 INDEX
FIB4 SCORE: 1.23
FIB4 SCORE: 1.92

## 2025-03-11 ASSESSMENT — PATIENT HEALTH QUESTIONNAIRE - PHQ9
SUM OF ALL RESPONSES TO PHQ9 QUESTIONS 1 AND 2: 0
2. FEELING DOWN, DEPRESSED, IRRITABLE, OR HOPELESS: NOT AT ALL
1. LITTLE INTEREST OR PLEASURE IN DOING THINGS: NOT AT ALL

## 2025-03-11 NOTE — ANESTHESIA PROCEDURE NOTES
Airway    Date/Time: 3/11/2025 3:29 PM    Performed by: Axel Miller M.D.  Authorized by: Axel Miller M.D.    Location:  OR  Urgency:  Elective  Difficult Airway: No    Indications for Airway Management:  Anesthesia      Spontaneous Ventilation: absent    Sedation Level:  Deep  Preoxygenated: Yes    Patient Position:  Sniffing  Mask Difficulty Assessment:  0 - not attempted  Final Airway Type:  Endotracheal airway  Final Endotracheal Airway:  ETT  Cuffed: Yes    Technique Used for Successful ETT Placement:  Direct laryngoscopy    Insertion Site:  Oral  Blade Type:  Duncan  Laryngoscope Blade/Videolaryngoscope Blade Size:  3  ETT Size (mm):  6.5  Measured from:  Teeth  ETT to Teeth (cm):  20  Placement Verified by: auscultation and capnometry    Cormack-Lehane Classification:  Grade I - full view of glottis  Number of Attempts at Approach:  1

## 2025-03-11 NOTE — ED PROVIDER NOTES
ED Provider Note    CHIEF COMPLAINT  Chief Complaint   Patient presents with    Fall     TBI activation. Pt had GLF while transferring from wheelchair to chair. - LOC. Pt right leg was bent behind her torso with her right foot up to her head. - thinners. Complaining of head pain and right shoulder pain. + CSM all extremities.        EXTERNAL RECORDS REVIEWED  Other reviewed an office note from the patient's geriatrician September 11, 2023 that noted the patient has metastatic breast cancer.  Also noted have significant age-related cognitive decline and severe protein calorie malnutrition.    HPI/ROS  LIMITATION TO HISTORY   Select: MS provided signout at triage      Jacqueline Rodriguez is a 84 y.o. female who presents for evaluation after a fall.  The patient fell while transferring from her wheelchair to a chair.  She did strike her head and she presents with a headache as well as some neck pain.  She is found to have a severe deformity of the right femur and she was placed in a air splint.  She states she also has some right shoulder pain.  She seems to have little bit difficulty with speech as well as some confusion and not sure what the patient's baseline status is.  She does know her name.  She denies chest pain and difficulty with breathing.  But she does seem to be a poor historian.    PAST MEDICAL HISTORY   has a past medical history of Depression, Hyperlipidemia, and Memory loss or impairment.    SURGICAL HISTORY   has a past surgical history that includes knee replacement, total (Bilateral) and hip replacement, total (Right).    FAMILY HISTORY  No family history on file.    SOCIAL HISTORY  Social History     Tobacco Use    Smoking status: Never    Smokeless tobacco: Never   Vaping Use    Vaping status: Never Used   Substance and Sexual Activity    Alcohol use: Not Currently    Drug use: Never    Sexual activity: Not on file       CURRENT MEDICATIONS  Home Medications       Reviewed by Lore Arevalo R.N.  "(Registered Nurse) on 03/11/25 at 1132  Med List Status: Partial     Medication Last Dose Status   D-MANNOSE PO  Active   Multiple Vitamins-Minerals (MULTIVITAMIN ADULT) Chew Tab  Active                  Audit from Redirected Encounters    **Home medications have not yet been reviewed for this encounter**         ALLERGIES  No Known Allergies    PHYSICAL EXAM  VITAL SIGNS: BP (!) 154/72   Pulse 84   Temp 36.4 °C (97.6 °F) (Temporal)   Resp 16   Ht 1.702 m (5' 7\")   Wt 46 kg (101 lb 6.6 oz)   Breastfeeding No   BMI 15.88 kg/m²    General The patient appears cachectic and ill    Head no obvious signs of trauma.  The patient may have some slight proptosis of the left eye.    ENT atraumatic    Cervical, thoracic, lumbar spine has no midline tenderness nor step-offs    Pulmonary the patient's lungs are clear auscultation bilaterally with no pain with AP or lateral compression    Cardiovascular S1-S2 with a regular rate and rhythm    GI abdomen soft    Pelvis is stable    Extremities patient does have some pain to the right shoulder without obvious deformities.  She does have full range of motion.  She has an obvious deformity to the distal third femur on the right with angulation I do not appreciate a good dorsalis pedis pulse nor posterior tibial pulse on the right    Skin no signs of trauma    Neurologic examination GCS of 15    EKG/LABS  Results for orders placed or performed during the hospital encounter of 03/11/25   CBC WITH DIFFERENTIAL    Collection Time: 03/11/25 11:51 AM   Result Value Ref Range    WBC 7.7 4.8 - 10.8 K/uL    RBC 4.00 (L) 4.20 - 5.40 M/uL    Hemoglobin 12.7 12.0 - 16.0 g/dL    Hematocrit 38.7 37.0 - 47.0 %    MCV 96.8 81.4 - 97.8 fL    MCH 31.8 27.0 - 33.0 pg    MCHC 32.8 32.2 - 35.5 g/dL    RDW 48.9 35.9 - 50.0 fL    Platelet Count 233 164 - 446 K/uL    MPV 10.6 9.0 - 12.9 fL    Neutrophils-Polys 80.80 (H) 44.00 - 72.00 %    Lymphocytes 9.10 (L) 22.00 - 41.00 %    Monocytes 8.50 0.00 - " 13.40 %    Eosinophils 0.80 0.00 - 6.90 %    Basophils 0.40 0.00 - 1.80 %    Immature Granulocytes 0.40 0.00 - 0.90 %    Nucleated RBC 0.00 0.00 - 0.20 /100 WBC    Neutrophils (Absolute) 6.22 1.82 - 7.42 K/uL    Lymphs (Absolute) 0.70 (L) 1.00 - 4.80 K/uL    Monos (Absolute) 0.65 0.00 - 0.85 K/uL    Eos (Absolute) 0.06 0.00 - 0.51 K/uL    Baso (Absolute) 0.03 0.00 - 0.12 K/uL    Immature Granulocytes (abs) 0.03 0.00 - 0.11 K/uL    NRBC (Absolute) 0.00 K/uL   PROTHROMBIN TIME    Collection Time: 25 11:51 AM   Result Value Ref Range    PT 15.8 (H) 12.0 - 14.6 sec    INR 1.26 (H) 0.87 - 1.13   APTT    Collection Time: 25 11:51 AM   Result Value Ref Range    APTT 27.4 24.7 - 36.0 sec   COMP METABOLIC PANEL    Collection Time: 25 11:51 AM   Result Value Ref Range    Sodium 139 135 - 145 mmol/L    Potassium 3.6 3.6 - 5.5 mmol/L    Chloride 106 96 - 112 mmol/L    Co2 24 20 - 33 mmol/L    Anion Gap 9.0 7.0 - 16.0    Glucose 132 (H) 65 - 99 mg/dL    Bun 27 (H) 8 - 22 mg/dL    Creatinine 0.80 0.50 - 1.40 mg/dL    Calcium 9.2 8.5 - 10.5 mg/dL    Correct Calcium 9.4 8.5 - 10.5 mg/dL    AST(SGOT) 17 12 - 45 U/L    ALT(SGPT) 10 2 - 50 U/L    Alkaline Phosphatase 81 30 - 99 U/L    Total Bilirubin 0.3 0.1 - 1.5 mg/dL    Albumin 3.7 3.2 - 4.9 g/dL    Total Protein 6.3 6.0 - 8.2 g/dL    Globulin 2.6 1.9 - 3.5 g/dL    A-G Ratio 1.4 g/dL   ESTIMATED GFR    Collection Time: 25 11:51 AM   Result Value Ref Range    GFR (CKD-EPI) 73 >60 mL/min/1.73 m 2   EKG (NOW)    Collection Time: 25  1:12 PM   Result Value Ref Range    Report       [facility] Emergency Dept.    Test Date:  2025  Pt Name:    GURPREET KEMP                 Department: ER  MRN:        [id number]                      Room:       [facility]  Gender:     Female                       Technician: [id number]  :        [id number]                   Requested By:GAURAV ADAMS  Order #:    [id number]                    Bryan WILSON:  GAURAV ADAMS MD    Measurements  Intervals                                Axis  Rate:       82                           P:          -78  TX:         54                           QRS:        88  QRSD:       101                          T:          -49  QT:         497  QTc:        581    Interpretive Statements  Twelve-lead EKG shows a normal sinus rhythm with a ventricular rate of 82,  there is a lot of baseline artifact, no ST segment elevation or depression,  normal T waves.  The patient has a prolonged QTc at 581  Electronically Signed On 03- 13:12:51 PDT by GAURAV ADAMS MD         I have independently interpreted this EKG    RADIOLOGY/   I have independently interpreted the diagnostic imaging associated with this visit and am waiting the final reading from the radiologist.   My preliminary interpretation is as follows: X-rays reviewed the patient does have a significant angulated and shortened distal femur fracture on the right    Radiologist interpretation:  CT-CTA LOWER EXT WITH & W/O-POST PROCESS RIGHT   Final Result      1.  Comminuted, angulated and displaced distal femoral diaphyseal fracture. There is also a fracture component extending through the posterior lateral femur to the level of the arthroplasty which is normally displaced.      2.  No active extravasation of contrast.      3.  Patent common femoral, profunda femoral, superficial femoral and popliteal arteries with multifocal after cirrhotic plaque.      4.  Opacification of the peroneal artery to the level of the mid to distal tibia. The vessel does not opacify below that level possibly due to chronic occlusion or phase of the contrast bolus.      5.  Limited opacification of the peroneal artery to the mid to upper tibia.      DX-CHEST-PORTABLE (1 VIEW)   Final Result      No acute cardiopulmonary abnormality.      DX-FEMUR-1 VIEW RIGHT   Final Result      Comminuted, displaced oblique distal femoral diaphysis fracture.       CT-CSPINE WITHOUT PLUS RECONS   Final Result         Grade 1 anterolisthesis of C6 over C7.      No evidence of acute fracture or subluxation.      Subcentimeter left thyroid nodule. No further follow-up needed.      CT-HEAD W/O   Final Result         No acute process.      Age-related volume loss and chronic microvascular ischemic changes.                 PROCEDURES conscious sedation  Conscious Sedation Procedure Note    Indication: Right femur fracture reduction    Consent: Consent was unable to be obtained due to patient's condition.    Physician Involvement: The attending physician was present and supervising this procedure.    Pre-Sedation Documentation and Exam: I have personally completed a history, physical exam & review of systems for this patient (see notes).  Airway Assessment: normal  f3  Prior History of Anesthesia Complications: none    ASA Classification: Class 2 - A normal healthy patient with mild systemic disease    Sedation/ Anesthesia Plan: intravenous sedation    Medications Used: midazolam (Versed) intravenously and ketamine intravenously    Monitoring and Safety: The patient was placed on a cardiac monitor and vital signs, pulse oximetry and level of consciousness were continuously evaluated throughout the procedure. The patient was closely monitored until recovery from the medications was complete and the patient had returned to baseline status. Respiratory therapy was on standby at all times during the procedure.      (The following sections must be completed)  Post-Sedation Vital Signs: Vital signs were reviewed and were stable after the procedure (see flow sheet for vitals)            Intraservice Time: Greater than 10 minutes    Post-Sedation Exam: Patient's mentation has returned back to her baseline.  She does feel better with the reduction.  We have Keppra prophylactic oxygen as she is also received some pain medication.           Complications: none    I provided both the sedation  and procedure, a nurse was present at the bedside for the entire procedure.       Procedure right femur reduction  Under conscious sedation with ketamine and Versed I was able to reduce the angulation of the fracture.  Subsequently the patient continued to have no palpable dorsalis pedis or posterior pedal pulse and therefore she was sent emergently for CTA to evaluate the arterial flow to the right lower extremity.    COURSE & MEDICAL DECISION MAKING    This an 84-year-old female who presents the emergency department for evaluation after a fall while transferring.  CT scan of the head and neck was performed emergently to rule out a traumatic brain injury or potential cervical injury.  Subsequently on repeat exam she was noted to have a significant angulated fracture to the right femur.  This was reduced under conscious sedation.  I did not feel a good distal pulse to the right foot and therefore CT angiogram was ordered does not show any significant arterial injury.  The patient does feel better with the reduction and she was placed in traction at about 5 pounds.  The patient is a DNR but I suspect she would benefit from surgical intervention.  She will be admitted to the hospitalist and I did order preoperative workup including EKG and laboratory parameters.  Orthopedics will be notified.    Has had some right shoulder pain I suspect this is more of a contusion.  On the chest x-ray there is no obvious fracture nor dislocation and she does have good range of motion.        FINAL DIAGNOSIS  1.  Mechanical fall  2.  Concussion without loss of consciousness  3.  Right shoulder contusion  4.  Right displaced distal femur fracture  5.  Conscious sedation  6.  Right distal femur reduction    Disposition  The patient will be admitted to the hospitalist she is currently stable     Electronically signed by: Redd King M.D., 3/11/2025 11:35 AM

## 2025-03-11 NOTE — OP REPORT
DATE OF OPERATION: 3/11/2025     PREOPERATIVE DIAGNOSIS: Displaced right femoral shaft fracture    POSTOPERATIVE DIAGNOSIS: Same    PROCEDURE PERFORMED: Right femur shaft fracture fixation with intramedullary nail    SURGEON: Mateo Ryan M.D.     ASSISTANT: Denilson Alvarez MD - ortho trauma fellow  The use of the fellow as a surgical assistant was necessary for assistance with exposure, retraction, fracture reduction, instrumentation, and closure.      ANESTHESIA: General    SPECIMEN: None    ESTIMATED BLOOD LOSS: 75 mL    IMPLANTS: [place] 13 x 380 retrograde femoral nail      INDICATIONS: The patient is a 84 y.o. female who presented with above.  I discussed the risks and benefits of the procedure which include but are not limited to risks of infection, wound healing complication, neurovascular injury, pain, malunion, non-union, malrotation, and the medical risks of anesthesia including MI, stroke, and death.  Alternatives to surgery were also discussed, including non-operative management, which I did not recommend.  The patient was in agreement with the plan to proceed, and the informed consent was signed and documented.  I met with the patient pre-operatively and marked the operative extremity with their agreement.  We proceeded to the operating room.     DESCRIPTION OF PROCEDURE:  Patient was seen in the preoperative holding area on the day of surgery. The operative site was marked with my initials.  she was taken to the operating room and placed supine on the operative table.  Anesthesia was induced.  The operative extremity was prepped and draped in the normal sterile fashion.  Operative pause was conducted and the correct patient, site, side, procedure, and surgeon's initials on extremity were identified.  Small infrapatellar incision was performed.  The tendon was split in line with skin incision.  We placed our guidewire checking on AP and lateral views.  Our guidewires forced slightly proximal to  her usual start site due to the implant.  She did have a cruciate retaining knee replacement.  We then used into reamer to gain entry into the canal.  Next we placed our ball-tipped guidewire past the fracture site proximally into the hip.  This was then measured and sequential reaming ensued.  In order to avoid the classic extension deformity based on her knee replacement and posterior start site we placed a anterior blocking screw/wire to help correct this.  After reaming up to a 14.5 mm reamer with good cortical chatter we then placed our 13 mm nail over the guidewire maintaining our blocking wire.  There is a slight extension deformity although overall alignment was very reasonable.  We then drilled and placed multiple locking screws distally through the jig.  Next we placed 2 proximal locking screws using perfect [place] technique.  Jig was then removed.  We then replaced the blocking guidewire with a single 3.5 mm bicortical screw to avoid worsening deformity.  Final images were obtained showing reasonable reduction implant position.  The wounds were irrigated with sterile saline and closed in layered fashion.  Sterile dressings applied she was awoken taken to PACU stable condition.    POSTOPERATIVE PLAN: Weightbearing and motion as tolerated right lower extremity weight bearing.  Mobilize with physical and occupational therapies.  DVT prophylaxis with SCDs and Lovenox until mobilizing independently and then can be switched to aspirin for 4 weeks.  The patient will follow up in clinic in 2 weeks to check wounds and remove sutures/staples.      ____________________________________   Mateo Ryan M.D.   DD: 3/11/2025  4:46 PM

## 2025-03-11 NOTE — ED NOTES
Med Rec complete per staff from [facility] and  from [facility] via phone  Allergies reviewed  Antibiotics in the past 30 days:no  Anticoagulant in past 14 days:no    Pt goes through [facility] ([phone])    Pt resides at [facility] assisted living ([phone])    Staff member reports pt only receives Cranberry and Multi vitamin gummies every morning however patient has PRN medications. Staff member faxing MAR.     Addendum: still waiting for MAR. However spoke with  from [facility] ([phone]) and was able to read PRN narcotics but is unable to verify if patient has received them in past 30 days. Included medications on med rec.

## 2025-03-11 NOTE — ASSESSMENT & PLAN NOTE
As observed on xray. S/p reduction with traction by ED physician  - Admit to hospitalist service for further management and surgical intervention  - Orthopedic surgery on board for planned surgical repair of femur fracture  - [place] pain management until surgery, with caution for delirium risk  - Preoperative workup including EKG and laboratory parameters ordered by ED physician  - Hold DVT prophylaxis until post-surgery due to current surgical planning  - Continue current hospice care directives, respecting DNR status

## 2025-03-11 NOTE — ASSESSMENT & PLAN NOTE
History of metastatic breast cancer. Is not pursuing treatment. Is enrolled in hospice  - pain management.

## 2025-03-11 NOTE — ANESTHESIA PREPROCEDURE EVALUATION
" Case: [id number] Date/Time: 03/11/25 1445    Procedure: INSERTION, INTRAMEDULLARY PANDA, FEMUR, RETROGRADE (Right)    Location: [facility] OR 16 / SURGERY [facility]    Surgeons: Mateo Ryan M.D.            Relevant Problems   Other   (positive) Other closed fracture of femur, unspecified laterality, unspecified portion of femur, initial encounter ([facility])     BP (!) 146/65   Pulse 74   Temp 36.4 °C (97.6 °F) (Temporal)   Resp (!) 41   Ht 1.702 m (5' 7\")   Wt 46 kg (101 lb 6.6 oz)   SpO2 96%   Breastfeeding No   BMI 15.88 kg/m²       Physical Exam    Airway   Mallampati: II  TM distance: >3 FB  Neck ROM: full       Cardiovascular - normal exam  Rhythm: regular  Rate: normal  (-) murmur     Dental - normal exam           Pulmonary - normal exam  Breath sounds clear to auscultation     Abdominal    Neurological - normal exam                   Anesthesia Plan    ASA 3- EMERGENT   ASA physical status emergent criteria: compromised vital organ, limb or tissue    Plan - general       Airway plan will be ETT          Induction: intravenous    Postoperative Plan: Postoperative administration of opioids is intended.    Pertinent diagnostic labs and testing reviewed    Informed Consent:    Anesthetic plan and risks discussed with patient.    Use of blood products discussed with: patient whom consented to blood products.           "

## 2025-03-11 NOTE — ED NOTES
Pt medicated per MAR, pt tolerated , pt attached to monitor , call light in reach , no needs at this time

## 2025-03-11 NOTE — H&P
[facility] History & Physical Note    Date of Service  3/11/2025    [facility] Team: [facility]   Attending: Mannie Haider M.d.  Senior Resident: Dr. Jorge  Intern:  Dr. Juarez  Contact Number: [phone]    Primary Care Physician  Clary Shelton P.A.-C.    Consultants  orthopedics    Specialist Names: Dr. Ryan    Code Status  DNAR/DNI    Chief Complaint  Chief Complaint   Patient presents with    Fall     TBI activation. Pt had GLF while transferring from wheelchair to chair. - LOC. Pt right leg was bent behind her torso with her right foot up to her head. - thinners. Complaining of head pain and right shoulder pain. + CSM all extremities.      History of Presenting Illness (HPI):   Jacqueline Rodriguez, an 84-year-old female with a pmhx of metastatic breast cancer, severe malnutrition and age related neurocognitive decline presented to the emergency department following a fall while transferring from her wheelchair to a chair. During the fall, she struck her head but did not lose consciousness. She reports a headache and neck pain post-fall. She also complains of right shoulder pain. On examination, a severe deformity of the right femur was noted, and the patient was placed in an air splint. She exhibited some speech difficulty and confusion, though she was oriented to her name. A CT scan of the head and neck was performed to rule out traumatic brain injury or cervical injury, with no acute abnormalities found. A repeat examination revealed a significant angulated fracture to the right femur (confirmed with xray), which was reduced under conscious sedation. A CT angiogram of the right lower extremity showed no significant arterial injury, although distal pulses were initially weak. The patient reports some relief post-reduction and was placed in traction at about 5 pounds. She is a DNR but is expected to benefit from surgical intervention. Orthopedics was consulted in the ED has agreed to repair the  fracture, and preoperative workup was initiated.    Labs and imaging:  CT Head and Neck: No acute process  Right Lower Extremity X-ray: Comminuted, displaced oblique fracture of the right distal femoral diaphysis  CT Angiogram (Right Lower Extremity): No significant arterial injury.  Chest X-ray: No fracture or dislocation. No CP patho  EKG: red prolonged QTC however on review observed normal sinus rhythm with artifact.  Laboratory Results: CMP and CBC unremarkable, PT/INR 15.8/1.26    Discussed case with son, agreed to plan for surgery for femur fracture. Was instructed on the risks of proceeding with the surgery, including death.    I discussed the plan of care with patient and family.    Review of Systems  Review of Systems   Reason unable to perform ROS: Difficult to access due to mentation, however patient does complain of pain in right hip.     Past Medical History   has a past medical history of Depression, Hyperlipidemia, and Memory loss or impairment.    Surgical History   has a past surgical history that includes knee replacement, total (Bilateral) and hip replacement, total (Right).     Family History  family history is not on file.   Family history reviewed with patient.     Social History  Tobacco: Unable to access  Alcohol: Unable to access  Recreational drugs (illegal or prescription): Unable to access  Employment: Retired  Living Situation: Lives in group home with hospice  Recent Travel: Unable to access  Primary Care Provider: Not Reviewed  Other (stressors, spirituality, exposures): Unable to access    Allergies  No Known Allergies    Medications  Prior to Admission Medications   Prescriptions Last Dose Informant Patient Reported? Taking?   CRANBERRY PO 3/11/2025 Morning  Yes Yes   Sig: Take 1 Tablet by mouth every morning.   Multiple Vitamins-Minerals (MULTI-VITAMIN GUMMIES PO) 3/11/2025 Morning  Yes Yes   Sig: Take 2 Tablets by mouth every morning.      Facility-Administered Medications: None      Physical Exam  Temp:  [35.9 °C (96.7 °F)-36.4 °C (97.6 °F)] 35.9 °C (96.7 °F)  Pulse:  [] 73  Resp:  [16-41] 18  BP: (112-180)/(54-78) 152/63  SpO2:  [90 %-100 %] 94 %  Blood Pressure : (!) 146/65   Temperature: 36.4 °C (97.6 °F)   Pulse: 74   Respiration: (!) 41   Pulse Oximetry: 96 %       Physical Exam  Constitutional:       General: She is in acute distress.      Appearance: She is ill-appearing.   HENT:      Head: Normocephalic.      Right Ear: External ear normal.      Left Ear: External ear normal.      Nose: Nose normal.      Mouth/Throat:      Mouth: Mucous membranes are dry.   Eyes:      Pupils: Pupils are equal, round, and reactive to light.   Cardiovascular:      Rate and Rhythm: Normal rate and regular rhythm.      Pulses: Normal pulses.      Heart sounds: Normal heart sounds.   Pulmonary:      Effort: Pulmonary effort is normal.      Breath sounds: Normal breath sounds.   Abdominal:      General: Abdomen is flat.      Palpations: Abdomen is soft.   Musculoskeletal:         General: Tenderness and deformity present.      Cervical back: Normal range of motion.      Comments: Traction brace present. Obvious femur deformity noted.   Skin:     General: Skin is dry.      Capillary Refill: Capillary refill takes 2 to 3 seconds.      Coloration: Skin is pale.   Neurological:      General: No focal deficit present.      Mental Status: She is alert and oriented to person, place, and time.      Cranial Nerves: No cranial nerve deficit.   Psychiatric:         Behavior: Behavior normal.         Judgment: Judgment normal.       Laboratory:  Recent Labs     03/11/25  1151   WBC 7.7   RBC 4.00*   HEMOGLOBIN 12.7   HEMATOCRIT 38.7   MCV 96.8   MCH 31.8   MCHC 32.8   RDW 48.9   PLATELETCT 233   MPV 10.6     Recent Labs     03/11/25  1151   SODIUM 139   POTASSIUM 3.6   CHLORIDE 106   CO2 24   GLUCOSE 132*   BUN 27*   CREATININE 0.80   CALCIUM 9.2     Recent Labs     03/11/25  1151   ALTSGPT 10   ASTSGOT 17  "  ALKPHOSPHAT 81   TBILIRUBIN 0.3   GLUCOSE 132*     Recent Labs     03/11/25  1151   APTT 27.4   INR 1.26*     No results for input(s): \"NTPROBNP\" in the last 72 hours.      No results for input(s): \"TROPONINT\" in the last 72 hours.    Imaging:  CT-CTA LOWER EXT WITH & W/O-POST PROCESS RIGHT   Final Result      1.  Comminuted, angulated and displaced distal femoral diaphyseal fracture. There is also a fracture component extending through the posterior lateral femur to the level of the arthroplasty which is normally displaced.      2.  No active extravasation of contrast.      3.  Patent common femoral, profunda femoral, superficial femoral and popliteal arteries with multifocal after cirrhotic plaque.      4.  Opacification of the peroneal artery to the level of the mid to distal tibia. The vessel does not opacify below that level possibly due to chronic occlusion or phase of the contrast bolus.      5.  Limited opacification of the peroneal artery to the mid to upper tibia.      DX-CHEST-PORTABLE (1 VIEW)   Final Result      No acute cardiopulmonary abnormality.      DX-FEMUR-1 VIEW RIGHT   Final Result      Comminuted, displaced oblique distal femoral diaphysis fracture.      CT-CSPINE WITHOUT PLUS RECONS   Final Result         Grade 1 anterolisthesis of C6 over C7.      No evidence of acute fracture or subluxation.      Subcentimeter left thyroid nodule. No further follow-up needed.      CT-HEAD W/O   Final Result         No acute process.      Age-related volume loss and chronic microvascular ischemic changes.               DX-PORTABLE FLUORO > 1 HOUR    (Results Pending)   DX-FEMUR-2+ RIGHT    (Results Pending)     X-Ray:  I have personally reviewed the images and compared with prior images.    Assessment/Plan:  Problem Representation:     I anticipate this patient will require at least two midnights for appropriate medical management, necessitating inpatient admission because Needs surgery for femur " fracture    Patient will need a Med/Surg bed on MEDICAL service .  The need is secondary to Femur fracture.    * Closed displaced oblique fracture of shaft of right femur (HCC)- (present on admission)  Assessment & Plan  As observed on xray. S/p reduction with traction by ED physician  - Admit to hospitalist service for further management and surgical intervention  - Orthopedic surgery on board for planned surgical repair of femur fracture  - [place] pain management until surgery, with caution for delirium risk  - Preoperative workup including EKG and laboratory parameters ordered by ED physician  - Hold DVT prophylaxis until post-surgery due to current surgical planning  - Continue current hospice care directives, respecting DNR status    Breast cancer ([facility])  Assessment & Plan  History of metastatic breast cancer. Is not pursuing treatment. Is enrolled in hospice  - pain management.    Severe protein-calorie malnutrition ([place]: less than 60% of standard weight) ([facility])- (present on admission)  Assessment & Plan  BMI 15.88.   - regular diet  - hospice    Age-related cognitive decline- (present on admission)  Assessment & Plan  Chronic, prev hx  - delirium precautions    VTE prophylaxis: pharmacologic prophylaxis contraindicated due to need for surgery

## 2025-03-11 NOTE — CONSULTS
"3/11/2025    Time Called: 1300  Time Arrived: 1430      HPI: Jacqueline Rodriguez is a 84 y.o. female who presents with right distal femoral shaft fracture.  She has a history of dementia as well as reported breast cancer.  She was found down at her facility and noted to have a angulated right femur.  X-rays in the ED revealed displaced distal third femoral shaft fracture above a total knee replacement.    Past Medical History:   Diagnosis Date    Depression     Hyperlipidemia     Memory loss or impairment        Past Surgical History:   Procedure Laterality Date    HIP REPLACEMENT, TOTAL Right     KNEE REPLACEMENT, TOTAL Bilateral        Medications  No current facility-administered medications on file prior to encounter.     Current Outpatient Medications on File Prior to Encounter   Medication Sig Dispense Refill    Multiple Vitamins-Minerals (MULTI-VITAMIN GUMMIES PO) Take 2 Tablets by mouth every morning.      CRANBERRY PO Take 1 Tablet by mouth every morning.         Allergies  Patient has no known allergies.    ROS  Unable to obtain    No family history on file.    Social History     Socioeconomic History    Marital status: Unknown   Tobacco Use    Smoking status: Never    Smokeless tobacco: Never   Vaping Use    Vaping status: Never Used   Substance and Sexual Activity    Alcohol use: Not Currently    Drug use: Never       Physical Exam  Vitals  BP (!) 146/65   Pulse 74   Temp 36.4 °C (97.6 °F) (Temporal)   Resp (!) 41   Ht 1.702 m (5' 7\")   Wt 46 kg (101 lb 6.6 oz)   SpO2 96%   General: Well Developed, Well Nourished, Age appropriate appearance  HEENT: Normocephalic, atraumatic  Psych: Normal mood and affect  Neck: Supple, nontender, no masses  Lungs: Breathing unlabored, No audible wheezing  Heart: Regular heart rate and rhythm  Abdomen: Soft, NT, ND  Neuro: Sensation grossly intact to BUE and BLE, moving all four extremities  Skin: Intact, no open wounds  Vascular: Foot is warm, seconds  MSK: Right lower " extremity: Compartments soft and compressible.  Moving extremity.  Splint in place.  No open fractures noted.      Radiographs:  CT-CTA LOWER EXT WITH & W/O-POST PROCESS RIGHT   Final Result      1.  Comminuted, angulated and displaced distal femoral diaphyseal fracture. There is also a fracture component extending through the posterior lateral femur to the level of the arthroplasty which is normally displaced.      2.  No active extravasation of contrast.      3.  Patent common femoral, profunda femoral, superficial femoral and popliteal arteries with multifocal after cirrhotic plaque.      4.  Opacification of the peroneal artery to the level of the mid to distal tibia. The vessel does not opacify below that level possibly due to chronic occlusion or phase of the contrast bolus.      5.  Limited opacification of the peroneal artery to the mid to upper tibia.      DX-CHEST-PORTABLE (1 VIEW)   Final Result      No acute cardiopulmonary abnormality.      DX-FEMUR-1 VIEW RIGHT   Final Result      Comminuted, displaced oblique distal femoral diaphysis fracture.      CT-CSPINE WITHOUT PLUS RECONS   Final Result         Grade 1 anterolisthesis of C6 over C7.      No evidence of acute fracture or subluxation.      Subcentimeter left thyroid nodule. No further follow-up needed.      CT-HEAD W/O   Final Result         No acute process.      Age-related volume loss and chronic microvascular ischemic changes.               DX-PORTABLE FLUORO > 1 HOUR    (Results Pending)   DX-FEMUR-2+ RIGHT    (Results Pending)       Laboratory Values  Recent Labs     03/11/25  1151   WBC 7.7   RBC 4.00*   HEMOGLOBIN 12.7   HEMATOCRIT 38.7   MCV 96.8   MCH 31.8   MCHC 32.8   RDW 48.9   PLATELETCT 233   MPV 10.6     Recent Labs     03/11/25  1151   SODIUM 139   POTASSIUM 3.6   CHLORIDE 106   CO2 24   GLUCOSE 132*   BUN 27*     Recent Labs     03/11/25  1151   APTT 27.4   INR 1.26*         Impression: 84-year-old female assumed ground-level  fall with displaced right distal femoral shaft fracture above knee replacement.  Plan for fixation today with retrograde femoral nail versus distal femoral locking plate.  This was discussed with her son at length.    Plan:We discussed the diagnosis and findings with the patient at length.  We reviewed possible non operative and operative interventions and the risks and benefits of each of these.  she had a chance to ask questions and all of these were answered to her satisfaction. The patient chose to proceed with surgical intervention. Risks and benefits of surgery were discussed which include but are not limited to bleeding, infection, neurovascular damage, malunion, nonunion, instability, limb length discrepancy, DVT, PE, MI, Stroke and death. They understand these risks and wish to proceed.      Mateo Ryan MD  Orthopedic Trauma Surgery

## 2025-03-11 NOTE — ED TRIAGE NOTES
"Chief Complaint   Patient presents with    Fall     TBI activation. Pt had GLF while transferring from wheelchair to chair. - LOC. Pt right leg was bent behind her torso with her right foot up to her head. - thinners. Complaining of head pain and right shoulder pain. + CSM all extremities.      BIB EMS. [facility]. Pt right leg repositioned and placed in air splint. Given 100 mcg fentanyl. VS: 132/72, , SPO2 98% RA, GCS 15.     EMS reports pt is on hospice. Pt son is POA and was contacted. Pt taking morphine, toridol and haldol at home. Pt has breast and bone CA.     BP (!) 154/72   Pulse 84   Temp 36.4 °C (97.6 °F) (Temporal)   Resp 16   Ht 1.702 m (5' 7\")   Wt 46 kg (101 lb 6.6 oz)   Breastfeeding No   BMI 15.88 kg/m²         "

## 2025-03-11 NOTE — H&P
"     Brief Senior Admit Note    Date of Admission: 3/11/2025  Admission Status: Emergency  [facility] Team: [facility]  Attending: Dr. Mannie Haider  Senior Resident: Dr. Jorge  Intern: Dr. Juarez  Contact Number: [phone]        HPI  Jacqueline Rodriguez is a 84 y.o. year old female with a h/o gait instability, h/o right knee total replacement, generalized weakness, metastatic breast cancer currently on hospice who presented on 3/11/25 after ground level fall at her living facility. She struck her head when she fell. Xrays of the right leg demonstrated comminuted displaced oblique femoral fracture. Patient initially expressed a desire to go home without the surgery but was more agreeable after explaining that the pain will be much easier to manage after fixing the fracture. Dr. Juarez confirmed with her son that this plan for surgery makes the most sense in minimizing suffering. He also understands that there is a very high risk that she may die during the surgery. He shared with us that his mother has been on hospice for a year and she is about to \"graduate\" from it. BP elevated 150-180/70s, afebrile, normal heart rate, on room air. She says her pain is mostly located on her leg but she also has some right shoulder pain. The fracture was reduced with traction under conscious sedation.    ED workup notable for:  - Fairly unremarkable CMP & CBC  - PT/INR 15.8/1.26  - Right lower extremity XR: comminuted, displaced oblique distal femoral diaphysis fracture  - CT C spine: no evidence of acute fracture or subluxation  - CTH w/o: no acute process  - EKG presumed NSR although lots of artifact    Assessment and plan  Jacqueline Rodriguez is a 84 y.o. year old female admitted for right comminuted femur fracture on 3/11/2025.    #Right comminuted, displaced oblique distal femoral diaphysis fracture  #H/o right knee replacement  #Metastatic breast cancer  #Severe malnutrition  #Age-related cognitive decline  - Orthopaedic surgery consulted, " plan for surgical repair  - [place] pain management until surgery with understanding that patient is at risk for delirium    Please see Dr. Juarez's H&P for more details.    Past Medical History  Past Medical History:   Diagnosis Date    Depression     Hyperlipidemia     Memory loss or impairment          Past Surgical History  Past Surgical History:   Procedure Laterality Date    HIP REPLACEMENT, TOTAL Right     KNEE REPLACEMENT, TOTAL Bilateral          Medications     Prior to Admission Medications   Prescriptions Last Dose Informant Patient Reported? Taking?   D-MANNOSE PO   Yes No   Sig: Take 1 Capsule by mouth 2 (two) times a day. Family supplies-Solaray D-Mannose with CranActin   Multiple Vitamins-Minerals (MULTIVITAMIN ADULT) Chew Tab   Yes No   Sig: Chew 1 Dose every day.      Facility-Administered Medications: None      Vitals:  Temp:  [36.4 °C (97.6 °F)] 36.4 °C (97.6 °F)  Pulse:  [79-86] 81  Resp:  [16-41] 41  BP: (138-180)/(63-76) 180/73  SpO2:  [90 %-100 %] 99 %    CT-CTA LOWER EXT WITH & W/O-POST PROCESS RIGHT   Final Result      1.  Comminuted, angulated and displaced distal femoral diaphyseal fracture. There is also a fracture component extending through the posterior lateral femur to the level of the arthroplasty which is normally displaced.      2.  No active extravasation of contrast.      3.  Patent common femoral, profunda femoral, superficial femoral and popliteal arteries with multifocal after cirrhotic plaque.      4.  Opacification of the peroneal artery to the level of the mid to distal tibia. The vessel does not opacify below that level possibly due to chronic occlusion or phase of the contrast bolus.      5.  Limited opacification of the peroneal artery to the mid to upper tibia.      DX-CHEST-PORTABLE (1 VIEW)   Final Result      No acute cardiopulmonary abnormality.      DX-FEMUR-1 VIEW RIGHT   Final Result      Comminuted, displaced oblique distal femoral diaphysis fracture.       CT-CSPINE WITHOUT PLUS RECONS   Final Result         Grade 1 anterolisthesis of C6 over C7.      No evidence of acute fracture or subluxation.      Subcentimeter left thyroid nodule. No further follow-up needed.      CT-HEAD W/O   Final Result         No acute process.      Age-related volume loss and chronic microvascular ischemic changes.                     No new Assessment & Plan notes have been filed under this hospital service since the last note was generated.  Service: Hospital Medicine        DVT ppx = holding until surgery  Code Status = DNR/DNI  Dispo = home after surgery     Cristina Jorge MD PGY-2  [facility]     Plan has been discussed with Attending Physician.

## 2025-03-11 NOTE — NON-PROVIDER
[facility] INTERNAL MEDICINE HISTORY AND PHYSICAL     PATIENT ID:  NAME:  Jacqueline Rodriguez  MRN:               [id number]  YOB: 1940    Date of Admission: 3/11/2025     Attending: Mannie Haider MD    Resident: Dr. Cristina Jorge and Dr. Janusz Juarez    CC:  GLF     HPI: Jacqueline Rodriguez is a 84 y.o. female with a past medical history of metastatic breast cancer, severe protein calorie malnutrition, age-related cognitive decline who is currently residing at an assisted living facility on hospice who presented to the emergency department after a mechanical GLF. Per report, patient was at her living facility earlier transferring from her wheelchair to another chair when she fell and hit her head. Patient did not lose consciousness but she was complaining of a headache and neck pain after the fall. She presented to the emergency department where she was found to have a significant deformity of her right femur. X-ray of her right femur showed comminuted, displaced oblique fracture of right distal femoral diaphysis. Patient had a right femur fracture reduction under conscious sedation performed by Dr. King in the ED. She did not have strong distal pulses so CT-A of right lower extremity was ordered which did not show any arterial injury. Orthopedic surgery was consulted who agreed to take the patient to the OR in hopes of better pain management following the procedure. Her last meal was at 9 am this morning. Patient is DNR.     Patient does admit to having additional pain in her right shoulder but denies any chest pain, worsening headache, shortness of breath. She is able to follow conversation and is oriented to the situation. CT-head, CT-C spine, chest x-ray were all negative for any acute abnormality. Patient had an unremarkable CMP, slightly elevated PT/INR, otherwise her labs were unremarkable.     REVIEW OF SYSTEMS:   Ten systems reviewed and were negative except as noted in the HPI.                PAST  MEDICAL HISTORY:  Past Medical History:   Diagnosis Date    Depression     Hyperlipidemia     Memory loss or impairment        PAST SURGICAL HISTORY:  Past Surgical History:   Procedure Laterality Date    HIP REPLACEMENT, TOTAL Right     KNEE REPLACEMENT, TOTAL Bilateral        FAMILY HISTORY:  No family history on file.    DIET:   Orders Placed This Encounter   Procedures    Diet NPO Restrict to: Strict     Standing Status:   Standing     Number of Occurrences:   1     Diet NPO Restrict to::   Strict [1]       ALLERGIES:  No Known Allergies    OUTPATIENT MEDICATIONS:    Current Facility-Administered Medications:     ketamine (Ketalar) 50 mg/mL injection, , , ED ONCE PRN, Mahin Toro M.D., 50 mg at 25 1203    midazolam (Versed) injection, , , ED ONCE PRN, Mahin Toro M.D., 1 mg at 25 1203    senna-docusate (Pericolace Or Senokot S) 8.6-50 MG per tablet 2 Tablet, 2 Tablet, Oral, Q EVENING **AND** polyethylene glycol/lytes (Miralax) Packet 1 Packet, 1 Packet, Oral, QDAY PRN, Silvestre Juarez M.D.    ondansetron (Zofran) syringe/vial injection 4 mg, 4 mg, Intravenous, Q4HRS PRN **OR** ondansetron (Zofran ODT) dispertab 4 mg, 4 mg, Oral, Q4HRS PRN, Silvestre Juarez M.D.    Current Outpatient Medications:     Multiple Vitamins-Minerals (MULTI-VITAMIN GUMMIES PO), Take 2 Tablets by mouth every morning., Disp: , Rfl:     CRANBERRY PO, Take 1 Tablet by mouth every morning., Disp: , Rfl:     PHYSICAL EXAM:  Vitals:    25 1401 25 1416 25 1431 25 1436   BP: 112/54 135/58 136/64 (!) 146/65   Pulse: 84 76 78 74   Resp:       Temp:       TempSrc:       SpO2: 92% 94% 96% 96%   Weight:       Height:       , Temp (24hrs), Av.4 °C (97.6 °F), Min:36.4 °C (97.6 °F), Max:36.4 °C (97.6 °F)  , Pulse Oximetry: 96 %, O2 (LPM): 6    General: Pt resting in NAD, cooperative. Slow to speak.   Skin:  Pink, warm and dry.  No rashes  HEENT: Normocephalic, atraumatic. EOMI. Mostly edentulous.    Neck:  Supple without lymphadenopathy or rigidity.   Lungs:  Symmetrical.  CTAB with no W/R/R.  Good air movement   Cardiovascular:  S1/S2 RRR without murmurs  Extremities:  Exam limited secondary to condition. Right splint in place. Neurovascularly intact distally.   CNS: No gross focal neurologic deficits. Follows conversation and understands why she is in the hospital.       LAB TESTS:   Recent Labs     03/11/25  1151   WBC 7.7   RBC 4.00*   HEMOGLOBIN 12.7   HEMATOCRIT 38.7   MCV 96.8   MCH 31.8   RDW 48.9   PLATELETCT 233   MPV 10.6   NEUTSPOLYS 80.80*   LYMPHOCYTES 9.10*   MONOCYTES 8.50   EOSINOPHILS 0.80   BASOPHILS 0.40         Recent Labs     03/11/25  1151   SODIUM 139   POTASSIUM 3.6   CHLORIDE 106   CO2 24   BUN 27*   CREATININE 0.80   CALCIUM 9.2   ALBUMIN 3.7       IMAGES:  CT-CTA LOWER EXT WITH & W/O-POST PROCESS RIGHT   Final Result      1.  Comminuted, angulated and displaced distal femoral diaphyseal fracture. There is also a fracture component extending through the posterior lateral femur to the level of the arthroplasty which is normally displaced.      2.  No active extravasation of contrast.      3.  Patent common femoral, profunda femoral, superficial femoral and popliteal arteries with multifocal after cirrhotic plaque.      4.  Opacification of the peroneal artery to the level of the mid to distal tibia. The vessel does not opacify below that level possibly due to chronic occlusion or phase of the contrast bolus.      5.  Limited opacification of the peroneal artery to the mid to upper tibia.      DX-CHEST-PORTABLE (1 VIEW)   Final Result      No acute cardiopulmonary abnormality.      DX-FEMUR-1 VIEW RIGHT   Final Result      Comminuted, displaced oblique distal femoral diaphysis fracture.      CT-CSPINE WITHOUT PLUS RECONS   Final Result         Grade 1 anterolisthesis of C6 over C7.      No evidence of acute fracture or subluxation.      Subcentimeter left thyroid nodule. No further  follow-up needed.      CT-HEAD W/O   Final Result         No acute process.      Age-related volume loss and chronic microvascular ischemic changes.               DX-PORTABLE FLUORO > 1 HOUR    (Results Pending)   DX-FEMUR-2+ RIGHT    (Results Pending)       ASSESSMENT/PLAN:   84 y.o. female who is DNR and on hospice admitted for right femur fracture with plans of surgery for pain reduction.     #Right Femur Fracture  Assessment & Plan  Patient found to have a right commuted, displaced oblique distal femoral diaphysis fracture after a mechanical GLF. She is guarded but in no acute distress. Patient is on hospice and is DNR but discussed with the patient if she decides not to do surgery she will likely be in a significant amount of pain that will likely be difficult to control. Patient did express that she eventually wants to go back to her assisted living facility but after discussing the risks and benefits she agreed to surgery. Since this patient is still in significant amount of pain, we also discussed the plan of care with patient's son, and we discussed the risks and benefits of the procedure, including the very real possibility of death if they do decide to go through with the procedure. Patient's son also verbalizes agreement and agrees with the current plan of care.     Plan:   - Consult ortho regarding plan for possible surgery  - Continue adequate pain management   - Continue to update the patient and the patient's son about the patient's condition.   - Continue to monitor for any signs of impending neurovascular involvement.     César Gamino  Medical Student   [employer] [facility]   Attending: Mannie Haider MD

## 2025-03-11 NOTE — PROGRESS NOTES
Right femur periprosthetic fracture  Plan for fixation this afternoon if amenable   Continue NPO for now      Mateo Ryan MD  Orthopedic Trauma Surgery

## 2025-03-12 VITALS
DIASTOLIC BLOOD PRESSURE: 51 MMHG | TEMPERATURE: 97.9 F | HEIGHT: 67 IN | BODY MASS INDEX: 16.26 KG/M2 | HEART RATE: 99 BPM | OXYGEN SATURATION: 94 % | SYSTOLIC BLOOD PRESSURE: 116 MMHG | RESPIRATION RATE: 18 BRPM | WEIGHT: 103.62 LBS

## 2025-03-12 LAB
ERYTHROCYTE [DISTWIDTH] IN BLOOD BY AUTOMATED COUNT: 49.7 FL (ref 35.9–50)
HCT VFR BLD AUTO: 33.4 % (ref 37–47)
HGB BLD-MCNC: 10.9 G/DL (ref 12–16)
MCH RBC QN AUTO: 32.2 PG (ref 27–33)
MCHC RBC AUTO-ENTMCNC: 32.6 G/DL (ref 32.2–35.5)
MCV RBC AUTO: 98.8 FL (ref 81.4–97.8)
PLATELET # BLD AUTO: 229 K/UL (ref 164–446)
PMV BLD AUTO: 11 FL (ref 9–12.9)
RBC # BLD AUTO: 3.38 M/UL (ref 4.2–5.4)
WBC # BLD AUTO: 14.5 K/UL (ref 4.8–10.8)

## 2025-03-12 PROCEDURE — 97163 PT EVAL HIGH COMPLEX 45 MIN: CPT

## 2025-03-12 PROCEDURE — 85027 COMPLETE CBC AUTOMATED: CPT

## 2025-03-12 PROCEDURE — 700102 HCHG RX REV CODE 250 W/ 637 OVERRIDE(OP)

## 2025-03-12 PROCEDURE — 700101 HCHG RX REV CODE 250

## 2025-03-12 PROCEDURE — A9270 NON-COVERED ITEM OR SERVICE: HCPCS

## 2025-03-12 PROCEDURE — 97535 SELF CARE MNGMENT TRAINING: CPT

## 2025-03-12 PROCEDURE — 97167 OT EVAL HIGH COMPLEX 60 MIN: CPT

## 2025-03-12 PROCEDURE — 700111 HCHG RX REV CODE 636 W/ 250 OVERRIDE (IP): Mod: JZ

## 2025-03-12 PROCEDURE — 36415 COLL VENOUS BLD VENIPUNCTURE: CPT

## 2025-03-12 PROCEDURE — 99239 HOSP IP/OBS DSCHRG MGMT >30: CPT | Mod: GC | Performed by: INTERNAL MEDICINE

## 2025-03-12 PROCEDURE — 51798 US URINE CAPACITY MEASURE: CPT

## 2025-03-12 RX ORDER — ATROPINE SULFATE 10 MG/ML
2 SOLUTION/ DROPS OPHTHALMIC EVERY 4 HOURS PRN
Status: DISCONTINUED | OUTPATIENT
Start: 2025-03-12 | End: 2025-03-12 | Stop reason: HOSPADM

## 2025-03-12 RX ORDER — ONDANSETRON 2 MG/ML
8 INJECTION INTRAMUSCULAR; INTRAVENOUS EVERY 8 HOURS PRN
Status: DISCONTINUED | OUTPATIENT
Start: 2025-03-12 | End: 2025-03-12 | Stop reason: HOSPADM

## 2025-03-12 RX ORDER — ONDANSETRON 4 MG/1
8 TABLET, ORALLY DISINTEGRATING ORAL EVERY 8 HOURS PRN
Status: DISCONTINUED | OUTPATIENT
Start: 2025-03-12 | End: 2025-03-12 | Stop reason: HOSPADM

## 2025-03-12 RX ORDER — MORPHINE SULFATE 4 MG/ML
2 INJECTION INTRAVENOUS EVERY 4 HOURS PRN
Status: DISCONTINUED | OUTPATIENT
Start: 2025-03-12 | End: 2025-03-12 | Stop reason: HOSPADM

## 2025-03-12 RX ORDER — LIDOCAINE 4 G/G
1 PATCH TOPICAL ONCE
Status: DISCONTINUED | OUTPATIENT
Start: 2025-03-12 | End: 2025-03-12 | Stop reason: HOSPADM

## 2025-03-12 RX ORDER — MORPHINE SULFATE 100 MG/5ML
10 SOLUTION ORAL
Refills: 0 | Status: DISCONTINUED | OUTPATIENT
Start: 2025-03-12 | End: 2025-03-12 | Stop reason: HOSPADM

## 2025-03-12 RX ORDER — LORAZEPAM 2 MG/ML
1 CONCENTRATE ORAL
Status: DISCONTINUED | OUTPATIENT
Start: 2025-03-12 | End: 2025-03-12 | Stop reason: HOSPADM

## 2025-03-12 RX ORDER — ASPIRIN 81 MG/1
81 TABLET ORAL DAILY
Qty: 30 TABLET | Refills: 0 | Status: SHIPPED | OUTPATIENT
Start: 2025-03-12

## 2025-03-12 RX ORDER — DOCUSATE SODIUM 100 MG/1
100 CAPSULE, LIQUID FILLED ORAL EVERY 12 HOURS
Status: DISCONTINUED | OUTPATIENT
Start: 2025-03-12 | End: 2025-03-12 | Stop reason: HOSPADM

## 2025-03-12 RX ORDER — ACETAMINOPHEN 650 MG/1
650 SUPPOSITORY RECTAL EVERY 4 HOURS PRN
Status: DISCONTINUED | OUTPATIENT
Start: 2025-03-12 | End: 2025-03-12 | Stop reason: HOSPADM

## 2025-03-12 RX ORDER — ACETAMINOPHEN 325 MG/1
650 TABLET ORAL EVERY 4 HOURS PRN
Status: DISCONTINUED | OUTPATIENT
Start: 2025-03-12 | End: 2025-03-12 | Stop reason: HOSPADM

## 2025-03-12 RX ORDER — LORAZEPAM 2 MG/ML
1 INJECTION INTRAMUSCULAR
Status: DISCONTINUED | OUTPATIENT
Start: 2025-03-12 | End: 2025-03-12 | Stop reason: HOSPADM

## 2025-03-12 RX ADMIN — MORPHINE SULFATE 2 MG: 4 INJECTION, SOLUTION INTRAMUSCULAR; INTRAVENOUS at 14:15

## 2025-03-12 RX ADMIN — ONDANSETRON 4 MG: 2 INJECTION INTRAMUSCULAR; INTRAVENOUS at 04:10

## 2025-03-12 RX ADMIN — LIDOCAINE 1 PATCH: 4 PATCH TOPICAL at 10:34

## 2025-03-12 ASSESSMENT — PATIENT HEALTH QUESTIONNAIRE - PHQ9
2. FEELING DOWN, DEPRESSED, IRRITABLE, OR HOPELESS: NOT AT ALL
1. LITTLE INTEREST OR PLEASURE IN DOING THINGS: NOT AT ALL
SUM OF ALL RESPONSES TO PHQ9 QUESTIONS 1 AND 2: 0

## 2025-03-12 ASSESSMENT — COGNITIVE AND FUNCTIONAL STATUS - GENERAL
MOVING FROM LYING ON BACK TO SITTING ON SIDE OF FLAT BED: TOTAL
WALKING IN HOSPITAL ROOM: TOTAL
SUGGESTED CMS G CODE MODIFIER MOBILITY: CN
DAILY ACTIVITIY SCORE: 13
DRESSING REGULAR LOWER BODY CLOTHING: A LOT
SUGGESTED CMS G CODE MODIFIER DAILY ACTIVITY: CL
MOVING TO AND FROM BED TO CHAIR: TOTAL
CLIMB 3 TO 5 STEPS WITH RAILING: TOTAL
TOILETING: A LOT
HELP NEEDED FOR BATHING: A LOT
DRESSING REGULAR UPPER BODY CLOTHING: A LOT
PERSONAL GROOMING: A LOT
MOBILITY SCORE: 6
TURNING FROM BACK TO SIDE WHILE IN FLAT BAD: TOTAL
STANDING UP FROM CHAIR USING ARMS: TOTAL
EATING MEALS: A LITTLE

## 2025-03-12 ASSESSMENT — ACTIVITIES OF DAILY LIVING (ADL): TOILETING: REQUIRES ASSIST

## 2025-03-12 ASSESSMENT — GAIT ASSESSMENTS: GAIT LEVEL OF ASSIST: UNABLE TO PARTICIPATE

## 2025-03-12 ASSESSMENT — PAIN DESCRIPTION - PAIN TYPE: TYPE: ACUTE PAIN;CHRONIC PAIN

## 2025-03-12 NOTE — PROGRESS NOTES
"      Orthopaedic Progress Note    Interval changes:  Patient doing well post op  RLE dressings are CDI  Cleared for DC to SNF by ortho pending medicine clearance    ROS - Patient denies any new issues.  Pain well controlled.    BP (!) 81/46   Pulse 87   Temp 36.7 °C (98.1 °F) (Temporal)   Resp 16   Ht 1.702 m (5' 7\")   Wt 47 kg (103 lb 9.9 oz)   SpO2 94%     Patient seen and examined  No acute distress  Breathing non labored  RRR  RLE dressings CDI, DNVI, moves all toes, cap refill <2 sec.    Recent Labs     03/11/25  1151 03/12/25  0645   WBC 7.7 14.5*   RBC 4.00* 3.38*   HEMOGLOBIN 12.7 10.9*   HEMATOCRIT 38.7 33.4*   MCV 96.8 98.8*   MCH 31.8 32.2   MCHC 32.8 32.6   RDW 48.9 49.7   PLATELETCT 233 229   MPV 10.6 11.0       Active Hospital Problems    Diagnosis     Closed displaced oblique fracture of shaft of right femur ([facility]) [S72.331A]     Breast cancer ([facility]) [C50.919]     Age-related cognitive decline [R41.81]     Severe protein-calorie malnutrition (Merrill: less than 60% of standard weight) ([facility]) [E43]        Assessment/Plan:  Patient doing well post op  RLE dressings are CDI  Cleared for DC to SNF by ortho pending medicine clearance  POD#1 S/P Right femur shaft fracture fixation with intramedullary nail  Wt bearing status - WBAT  Wound care/Drains - Dressings to be changed every other day by nursing. Or PRN for saturation starting POD#2  Future Procedures - none planned   Sutures/Staples out- 14-21 days post operatively. Removal will completed by ortho mid levels only.  PT/OT-initiated  Antibiotics: Perioperative completed  DVT Prophylaxis- TEDS/SCDs/Foot pumps  Benitez-not needed per ortho  Case Coordination for Discharge Planning - Disposition per therapy recs.    "

## 2025-03-12 NOTE — THERAPY
Occupational Therapy   Initial Evaluation     Patient Name: Jacqueline Rodriguez  Age:  84 y.o., Sex:  female  Medical Record #: [id number]  Today's Date: 3/12/2025     Precautions: Fall Risk, Weight Bearing As Tolerated Right Upper Extremity  Comments: R LE ROM AT    Assessment    Patient is 84 y.o. female admitted with femur fx following GLF during transfer from wheelchair, pt s/p R femur shaft fixation with IMN. Pt presents to OT eval with ADL independence limited by pain and hypotension when sitting EOB. Pt assisted back to supine 2/2 symptomatic orthostatic hypotension, c/o dizziness while seated EOB. Recommend return to [facility] if they are able to provide maxA for ADLs and transfers. If not, pt will require post-acute placement for additional therapy in order to return to her prior baseline.     Plan    Occupational Therapy Initial Treatment Plan   Treatment Interventions: Self Care / Activities of Daily Living, Adaptive Equipment, Therapeutic Exercises, Therapeutic Activity  Treatment Frequency: 3 Times per Week  Duration: Until Therapy Goals Met    DC Equipment Recommendations: Unable to determine at this time  Discharge Recommendations: [facility] if [facility] are able to provide maxA for ADLs and transfers. If not, recommend post-acute placement for additional occupational therapy services prior to discharge home     Objective     03/12/25 1127   Prior Living Situation   Prior Services Continuous (24 Hour) Care Giving Per Service   Housing / Facility Assisted Living Residence   Bathroom Set up Walk In Shower;Grab Bars;Shower Chair   Equipment Owned Wheelchair   Lives with - Patient's Self Care Capacity Attendant / Paid Care Giver   Prior Level of ADL Function   Self Feeding Independent   Grooming / Hygiene Requires Assist   Bathing Requires Assist   Dressing Requires Assist   Toileting Requires Assist   Comments staff provide assist for ADLs as needed   Prior Level of IADL Function   Medication Management  Requires Assist   Laundry Requires Assist   Kitchen Mobility Requires Assist   Finances Requires Assist   Home Management Requires Assist   Shopping Requires Assist   Prior Level Of Mobility Uses Wheel Chair for in Home Mobility   Precautions   Precautions Fall Risk;Weight Bearing As Tolerated Right Upper Extremity   Comments R LE ROM AT   Cognition    Cognition / Consciousness X   Speech/ Communication Delayed Responses;Dysarthric;Expressive Aphasia;Hard of Hearing   Level of Consciousness Alert   Ability To Follow Commands 1 Step   Safety Awareness Impaired   New Learning Impaired   Attention Impaired   Sequencing Impaired   Initiation Impaired   Comments expressive aphasia at basline   Strength Upper Body   Upper Body Strength  X   Gross Strength Generalized Weakness, Equal Bilaterally.    Comments baseline generalized weakness, equal bilaterally   Balance Assessment   Sitting Balance (Static) Fair   Sitting Balance (Dynamic) Fair -   Standing Balance (Static) Poor +   Standing Balance (Dynamic) Poor +   Weight Shift Sitting Fair   Weight Shift Standing Poor   Comments FWW in standing   Bed Mobility    Supine to Sit Total Assist   Sit to Supine Total Assist   Scooting Total Assist   Rolling Total Assist to Rt.   Comments bedmobility x2 assist   ADL Assessment   Eating Supervision   Grooming Maximal Assist;Seated   Upper Body Dressing Maximal Assist   Lower Body Dressing Maximal Assist   Toileting Maximal Assist   How much help from another person does the patient currently need...   Putting on and taking off regular lower body clothing? 2   Bathing (including washing, rinsing, and drying)? 2   Toileting, which includes using a toilet, bedpan, or urinal? 2   Putting on and taking off regular upper body clothing? 2   Taking care of personal grooming such as brushing teeth? 2   Eating meals? 3   6 Clicks Daily Activity Score 13   Functional Mobility   Sit to Stand Unable to Participate   Bed, Chair, Wheelchair  Transfer Unable to Participate   Mobility limited by orthostatic hypotension   Activity Tolerance   Sitting Edge of Bed 3min   Comments limited by pain and hypotension   Short Term Goals   Short Term Goal # 1 pt will tolerate >3min sitting EOB in prep for functional transfer   Short Term Goal # 2 pt will complete seated grooming ADL with set-up assist   Short Term Goal # 3 pt will complete functional transfer with ModA   Education Group   Education Provided Role of Occupational Therapist;Activities of Daily Living   Role of Occupational Therapist Patient Response Patient;Explanation;No Learning Evidence;Reinforcement Needed   ADL Patient Response Patient;Acceptance;Reinforcement Needed;No Learning Evidence   Occupational Therapy Initial Treatment Plan    Treatment Interventions Self Care / Activities of Daily Living;Adaptive Equipment;Therapeutic Exercises;Therapeutic Activity   Treatment Frequency 3 Times per Week   Duration Until Therapy Goals Met   Problem List   Problem List Decreased Homemaking Skills;Decreased Active Daily Living Skills;Decreased Upper Extremity Strength Left;Decreased Upper Extremity Strength Right;Decreased Functional Mobility;Decreased Activity Tolerance;Safety Awareness Deficits / Cognition;Impaired Cognitive Function;Impaired Postural Control / Balance;Limited Knowledge of Post Op Precautions   Anticipated Discharge Equipment and Recommendations   DC Equipment Recommendations Unable to determine at this time   Discharge Recommendations Recommend post-acute placement for additional occupational therapy services prior to discharge home   Interdisciplinary Plan of Care Collaboration   IDT Collaboration with  Nursing;Physical Therapist   Patient Position at End of Therapy In Bed;Bed Alarm On;Call Light within Reach;Tray Table within Reach;Phone within Reach

## 2025-03-12 NOTE — PROGRESS NOTES
Patient arrived to unit during shift change. Patient Aox1, with garbled speech, according to sonGanesh, that's is her baseline. Admission profile completed with son over the phone. Patient oriented to unit, bed left in lowest position, wheels locked, and call light left within reach. VSS

## 2025-03-12 NOTE — PROGRESS NOTES
Received report from night shift RNVika . Assumed care of pt at 0645. Pt reports no pain, nausea, vomiting, numbness, tingling, at this time. Aox2 (self and situation). Pt resting comfortably in bed. Fall precautions and education done. Educated on use of call light which is placed nearby patient. Hourly rounding and continuous pulse ox monitoring in place, O2 saturation at 93 on RA. Patient reports no other needs at the moment.

## 2025-03-12 NOTE — DISCHARGE PLANNING
Discussed with MD, possible dc today.   HOLLY called son and he is agreeable if cleared by MD. IMM discussed.     Son confirmed pt's place of residence is at [facility]. Address: [facility] [facility].    HOLLY called [facility].

## 2025-03-12 NOTE — PROGRESS NOTES
Comfort care orders initiated. Benitez in place by Anjel CASTRO. Discharge orders in. Patient given IV meds for transport with EMS. IV removed. Discharge paperwork discussed. Patient is alert and oriented times 2, therefore, this RN and Quin CASTRO signed paperwork.Follow up appointment discussed. Patient discharged to [facility] via EMS with hospice. Patient has all personal belongings.

## 2025-03-12 NOTE — DISCHARGE INSTRUCTIONS
Follow-Up Care:  Return to hospice care.  Follow up with orthopedic surgery in 1-2 weeks for staple removal and post-operative evaluation.    Activity Level:  Non-weight bearing on the right leg.  Activity as tolerated and directed by skilled nursing staff.    Diet:  NPO with sips allowed for medication administration only.    Medications:  Haloperidol 0.5 MG Tabs (Haldol):  Take 0.5 mg by mouth every 6 hours as needed for advanced hospice care.  Morphine 15 MG Tablet (MS IR):  Take 7.5 mg (1/2 tablet) by mouth every four hours as needed for severe pain.  Multi-Vitamin Gummies PO:  Take 2 tablets by mouth every morning.  Tramadol 50 MG Tabs (Ultram):  Take 50 mg by mouth every 6 hours as needed for mild pain.    Consultations:  Orthopedic surgery was consulted for femur repair and will follow up as planned.    Procedures:  Right femur shaft fracture fixation with intramedullary nail was performed on 03/12/2025.    Important Instructions:  Monitor for any signs of increased pain, swelling, or redness at the surgical site.  Seek immediate medical attention if there are signs of infection, such as fever or chills.  Continue with advanced hospice care directives, respecting the DNAR/DNI status.

## 2025-03-12 NOTE — ANESTHESIA TIME REPORT
Anesthesia Start and Stop Event Times       Date Time Event    3/11/2025 1454 Ready for Procedure     1524 Anesthesia Start     1701 Anesthesia Stop          Responsible Staff  03/11/25      Name Role Begin End    Axel Miller M.D. Anesth 1524 1701          Overtime Reason:  no overtime (within assigned shift)    Comments:

## 2025-03-12 NOTE — OR NURSING
1658: pt arrived to PACU in stable condition. VSS. Dressing to right leg is CDI.     1730: pt son called and updated on pt status. Pt noted to have slow garbled speech. Pt son confirmed that it is baseline.     Pt denies pain and nausea at this time.     1740: Report called to Kezia CASTRO. All pertinent pt information has been discussed.     1745: pt SBP 90s. Pt resting in bed and asymptomatic. MD Miller updated. Ok with going to floor.     Pt transported to room in stable condition.

## 2025-03-12 NOTE — DISCHARGE PLANNING
Case Management Discharge Planning    Admission Date: 3/11/2025  GMLOS: 4.4  ALOS: 1    6-Clicks ADL Score: 13  6-Clicks Mobility Score: 9        Anticipated Discharge Dispo: Discharge Disposition: Discharged to home/self care (01)[facility]     DME Needed: none    Action(s) Taken:   Discussed with IDT ,per MD, pt is clear to go back to [facility].     HOLLY met with Karan from [facility] and he is accepting pt. He will meet with pt today to have the consent signed.     CM notified son and he Ganesh and he is agreeable for pt to return to [facility] inplace.     Completed PCS and [facility] order . Requested for 1400  by [facility].     Escalations Completed: n/a    Medically Clear: yes    Next Steps: set up transport    Barriers to Discharge: none    Is the patient up for discharge tomorrow: today.

## 2025-03-12 NOTE — DISCHARGE PLANNING
DC Transport Scheduled    Transport Company Scheduled:  [employer]  Spoke with Brittani at [facility] to schedule transport.    Scheduled Date: 3/12/2025  Scheduled Time: 1400    Transport Type: [facility]  Destination:   [facility]   Destination address: [address] [place] [address] Steven MICHEL     Notified care team of scheduled transport via [employer].     If there are any changes needed to the DC transportation scheduled, please contact [facility] at ext. [phone] between the hours of 8948-2981. If outside those hours, contact the ED Case Manager at ext. [phone].

## 2025-03-12 NOTE — DISCHARGE PLANNING
Pt lives at [facility]. DANIELE Ruiz is DPOA.  Pt requires total care.     PCP is PAC is Clary Shelton.    Care Transition Team Assessment    Information Source  Orientation Level: Oriented to person, Disoriented to time, Disoriented to situation, Disoriented to place  Information Given By: Other (Comments)  Informant's Name: Ganesh Rodriguez  Who is responsible for making decisions for patient? : Adult child    Readmission Evaluation  Is this a readmission?: No    Elopement Risk  Legal Hold: No  Ambulatory or Self Mobile in Wheelchair: No-Not an Elopement Risk    Interdisciplinary Discharge Planning  Does Admitting Nurse Feel This Could be a Complex Discharge?: No  Lives with - Patient's Self Care Capacity: Other (Comments) (LTC)  Patient or legal guardian wants to designate a caregiver: Yes  Caregiver name: Ganesh Rodriguez  Support Systems: Other (Comments) (LTC)  Housing / Facility: Assisted Living Residence  [facility] of Care Facility: [facility]  Able to Return to Previous ADL's: Yes  Mobility Issues: Yes  Prior Services: Continuous (24 Hour) Care Giving Per Service  Patient Prefers to be Discharged to:: Assisted Living  Assistance Needed: Yes    Discharge Preparedness  What is your plan after discharge?: Other (comment) (assisted living)  What are your discharge supports?: Child  Prior Functional Level: Total Assist  Difficulity with ADLs: Walking, Toileting, Eating, Dressing, Brushing teeth, Bathing  Difficulity with IADLs: Cooking, Driving, Keeping track of finances, Laundry, Managing medication, Shopping, Using the telephone or computer    Functional Assesment  Prior Functional Level: Total Assist    Finances  Financial Barriers to Discharge: No  Prescription Coverage: Yes    Vision / Hearing Impairment  Vision Impairment : Yes  Right Eye Vision: Impaired  Left Eye Vision: Impaired  Hearing Impairment : Yes  Hearing Impairment: Both Ears, Hearing Device Not Available    Values / Beliefs /  Concerns  Values / Beliefs Concerns : No    Advance Directive  Advance Directive?: Living Will, DPOA for Health Care    Domestic Abuse  Have you ever been the victim of abuse or violence?: No  Possible Abuse/Neglect Reported to:: Not Applicable         Discharge Risks or Barriers  Discharge risks or barriers?: Post-acute placement / services    Anticipated Discharge Information  Discharge Disposition: Discharged to home/self care (01)

## 2025-03-12 NOTE — DISCHARGE PLANNING
[facility] Liaison  met pt and accepted. They will follow pt at [facility].     Karan GUILLORY at [facility] said that they would need an up to date order for hospice. HOLLY sent message to MD.

## 2025-03-12 NOTE — CARE PLAN
The patient is Stable - Low risk of patient condition declining or worsening    Shift Goals  Clinical Goals: pain mangement, monitor vs, encourage rest  Patient Goals: pain control  Family Goals: updates    Progress made toward(s) clinical / shift goals:    Problem: Fall Risk  Goal: Patient will remain free from falls  Outcome: Progressing  Note: Bed in lowest position, wheels locked, call light within reach, and bed alarm on.      Problem: Knowledge Deficit - Standard  Goal: Patient and family/care givers will demonstrate understanding of plan of care, disease process/condition, diagnostic tests and medications  Outcome: Progressing  Note: Patient educated on plan of care, patient verbalized understanding, will need reinforcement.      Problem: Skin Integrity  Goal: Skin integrity is maintained or improved  Outcome: Progressing  Note: Patient on low airloss bed, q2 turns implemented, heel mepilex and gray ear foams in place.        Patient is not progressing towards the following goals:

## 2025-03-12 NOTE — ANESTHESIA POSTPROCEDURE EVALUATION
Patient: Jacqueline Rodriguez    Procedure Summary       Date: 03/11/25 Room / Location: [place] [address] / SURGERY [facility]    Anesthesia Start: 1524 Anesthesia Stop: 1701    Procedure: RIGHT FEMUR SHAFT FRACTURE FIXATION, RETROGRADE, WITH INTREMEDULLARY NAIL (Right: Leg Lower) Diagnosis: (right distal femoral shaft fracture)    Surgeons: Mateo Ryan M.D. Responsible Provider: Axel Miller M.D.    Anesthesia Type: general ASA Status: 3 - Emergent            Final Anesthesia Type: general  Last vitals  BP   Blood Pressure : (!) 81/46 (EOB with ; supine 99/46 ; HOB 45 deg 113/50 )    Temp   36.7 °C (98.1 °F)    Pulse   87   Resp   16    SpO2   94 %      Anesthesia Post Evaluation    Patient location during evaluation: PACU  Patient participation: complete - patient participated  Level of consciousness: awake and alert    Airway patency: patent  Anesthetic complications: no  Cardiovascular status: hemodynamically stable  Respiratory status: face mask    PONV: none          No notable events documented.     Nurse Pain Score: 0 (NPRS)

## 2025-03-12 NOTE — PROGRESS NOTES
Patient requesting pain medications for her neck. Did bedside swallow with water and patient immediatly began coughing and pointing to her mouth. Patient states that she does have some trouble swallowing normally. MD made aware

## 2025-03-12 NOTE — PROGRESS NOTES
4 Eyes Skin Assessment Completed by MATTHEW Sandy and MATTHEW Arora.    Head WDL  Ears WDL  Nose WDL  Mouth WDL  Neck WDL  Breast/Chest open wound to right breast  Shoulder Blades Redness to left shoulder  Spine Redness and Blanching  (R) Arm/Elbow/Hand WDL  (L) Arm/Elbow/Hand WDL  Abdomen WDL  Groin WDL  Scrotum/Coccyx/Buttocks Redness and Blanching  (R) Leg LILIANA, surgical dressing in place  (L) Leg WDL  (R) Heel/Foot/Toe Scab, heels red, slow to monique, and boggy  (L) Heel/Foot/Toe heels red, slow to monique, and boggy          Devices In Places Blood Pressure Cuff, Pulse Ox, and Nasal Cannula, purewick      Interventions In Place Gray Ear Foams, Heel Mepilex, TAP System, Pillows, Q2 Turns, and Low Air Loss Mattress    Possible Skin Injury Yes    Pictures Uploaded Into Epic Yes  Wound Consult Placed Yes  RN Wound Prevention Protocol Ordered Yes

## 2025-03-12 NOTE — PROGRESS NOTES
Notified by nurse around 9 PM the patient's blood pressures are 54/30 and she is unresponsive after receiving morphine.  Rapid response was about to be called but the patient regained consciousness.  On bedside evaluation, patient was conscious, ANO x 1, which is her baseline.  500 mL bolus of Ringer's lactate was given resulted in improvement of her blood pressures to 113/53.  Overnight the blood pressures were maintained in the 100/60 range.

## 2025-03-12 NOTE — PROGRESS NOTES
20:41 - pt c/o severe head and right leg pain. /54, respirations 18, PRN 3mg IV morphine given    20:52 - patient unresponsive, BP 54/30, respirations 12. MD and rapid response nurse notified    20:56 - MD at bedside, patient now responsive, BP 82/40, respirations 12. LR bolus ordered.     21:50 - Bolus completed, /53, respirations 16. Patient AOx1

## 2025-03-12 NOTE — THERAPY
Physical Therapy   Initial Evaluation     Patient Name: Jacqueline Rodriguez  Age:  84 y.o., Sex:  female  Medical Record #: [id number]  Today's Date: 3/12/2025     Precautions  Precautions: Fall Risk  Comments: ROMAT RLE    Assessment  Patient is 84 y.o. female admitted after GLF sustaining R femoral shaft fx, s/p IM nail on 3/11. PMHx of dementia, metastatic breast CA, R TKA. Pt required total A for bed mobility, c/o dizziness with BP drop at EOB (returned once supine). Limited 2/2 RLE pain, weakness, impaired cognition, balance, activity tolerance, endurance and below her PLOF. Recommend return to halfway, on phone son reports pt receives 1-2 person assist for transfers to recliner/w/c. If unable to provide total A, recommend placement. Pt would benefit from continued acute IP PT services to address said deficits.     Plan    Physical Therapy Initial Treatment Plan   Treatment Plan : Bed Mobility, Equipment, Family / Caregiver Training, Gait Training, Manual Therapy, Neuro Re-Education / Balance, Self Care / Home Evaluation, Stair Training, Therapeutic Activities, Therapeutic Exercise  Treatment Frequency: 3 Times per Week  Duration: Until Therapy Goals Met    DC Equipment Recommendations: None  Discharge Recommendations:  (return to [facility], unless [facility] unable to provide max/total assist for all mobility, then recommend placement)       Subjective    Pt endorsing dizziness at EOB, see vitals below     Objective     03/12/25 1125   Vitals   Blood Pressure  (!) 81/46  (EOB with ; supine 99/46 ; HOB 45 deg 113/50 )   O2 Delivery Device None - Room Air   Pain 0 - 10 Group   Therapist Pain Assessment During Activity;Nurse Notified;Post Activity Pain Same as Prior to Activity  (c/o RLE pain not rated, agreeable to mobility)   Prior Living Situation   Prior Services Continuous (24 Hour) Care Giving Per Service   Housing / Facility Assisted Living Residence  ([facility])   Steps Into Home 0   Steps In Home 0   Equipment  "Owned Wheelchair   Lives with - Patient's Self Care Capacity   ([facility])   Prior Level of Functional Mobility   Bed Mobility Required Assist   Transfer Status Required Assist  (however, performed on her own at times when she was not \"supposed to\")   Ambulation   (did not participate in 1+ year)   Wheelchair Required Assist  (could self propel at times, indpendently transferred a times but majority of time requiring assist.)   Comments Spoke with pt's son on phone to clarify PLOF. Reporting pt has 1-2 person assist available for stand pivot transfers to w/c/recliner. Provided with meals, med management and assist dressing. Going back on hospice. Feels comfortable with pt returning there   Cognition    Cognition / Consciousness X   Speech/ Communication Delayed Responses;Expressive Aphasia;Hard of Hearing   Level of Consciousness Alert   Safety Awareness Impaired   New Learning Impaired   Attention Impaired   Sequencing Impaired   Initiation Impaired   Comments pleasant and cooperative.   Passive ROM Lower Body   Comments pain with R hip and knee flexion  to 90; lacking 20 deg R knee ext   Active ROM Lower Body    Comments partial LAQ about 20% of availalbe range on RLE; LLE LAQ 75% of available range. limited RLE by pain   Strength Lower Body   Comments RLE grossly 1+ to 2-/5; LLE 3-/5   Sensation Lower Body   Comments denies N/T in LE's; reports in neck however   Lower Body Muscle Tone   Lower Body Muscle Tone  WDL   Other Treatments   Other Treatments Provided Per RN at end of session, pt now may become comfort care 2/2 high pain and BP dropping to 50's/30's with IV pain meds   Balance Assessment   Sitting Balance (Static) Fair -   Sitting Balance (Dynamic) Poor -   Weight Shift Sitting Poor   Comments EOB Only 2/2 low BP   Bed Mobility    Supine to Sit Total Assist   Sit to Supine Total Assist   Scooting Total Assist   Rolling Total Assist to Rt.   Comments 2 person for safety   Gait Analysis   Gait Level Of Assist " Unable to Participate   Weight Bearing Status WBAT RLE   Functional Mobility   Sit to Stand Unable to Participate   Bed, Chair, Wheelchair Transfer Unable to Participate   Mobility EOB Only   6 Clicks Assessment - How much HELP from from another person do you currently need... (If the patient hasn't done an activity recently, how much help from another person do you think he/she would need if he/she tried?)   Turning from your back to your side while in a flat bed without using bedrails? 1   Moving from lying on your back to sitting on the side of a flat bed without using bedrails? 1   Moving to and from a bed to a chair (including a wheelchair)? 1   Standing up from a chair using your arms (e.g., wheelchair, or bedside chair)? 1   Walking in hospital room? 1   Climbing 3-5 steps with a railing? 1   6 clicks Mobility Score 6   Activity Tolerance   Sitting Edge of Bed 2-3 min   Comments limited 2/2 pain, balance, low BP   Patient / Family Goals    Patient / Family Goal #1 to have less pain   Short Term Goals    Short Term Goal # 1 Pt will perform supine <> sit with Stephen in 6 visits to get in/out of bed   Short Term Goal # 2 Pt will tolerate sitting EOB for 2 min with Fair balance and BUE support in 6 visits to prepare for OOB mobility   Short Term Goal # 3 Pt will perform STS with FWW and Stephen in 6 visits to return to PLOF   Short Term Goal # 4 Pt will perform stand pivot transfers with ModA in 6 visits to get in/out of chair   Education Group   Education Provided Role of Physical Therapist;Weight Bearing Status   Role of Physical Therapist Patient Response Patient;Acceptance;Explanation;Verbal Demonstration   Weight Bearing Status Patient Response Patient;Acceptance;Explanation;Verbal Demonstration;Action Demonstration;Reinforcement Needed   Additional Comments Pt receptive to self management and compensatory strategies with mobility   Physical Therapy Initial Treatment Plan    Treatment Plan  Bed  Mobility;Equipment;Family / Caregiver Training;Gait Training;Manual Therapy;Neuro Re-Education / Balance;Self Care / Home Evaluation;Stair Training;Therapeutic Activities;Therapeutic Exercise   Treatment Frequency 3 Times per Week   Duration Until Therapy Goals Met   Problem List    Problems Pain;Impaired Bed Mobility;Impaired Transfers;Impaired Ambulation;Functional Strength Deficit;Impaired Balance;Decreased Activity Tolerance;Safety Awareness Deficits / Cognition;Limited Knowledge of Post-Op Precautions;Motor Planning / Sequencing;Impaired Coordination;Functional ROM Deficit   Anticipated Discharge Equipment and Recommendations   DC Equipment Recommendations None   Discharge Recommendations   (return to [facility], unless [facility] unable to provide max/total assist for all mobility, then recommend placement)   Interdisciplinary Plan of Care Collaboration   IDT Collaboration with  Nursing;Occupational Therapist;Family / Caregiver   Patient Position at End of Therapy In Bed;Bed Alarm On;Call Light within Reach;Tray Table within Reach;Phone within Reach   Collaboration Comments RN updated   Session Information   Date / Session Number  3/12- 1 (1/3, 3/18)

## 2025-03-12 NOTE — DISCHARGE SUMMARY
[facility] Discharge Summary    Attending: Mannie Haider M.d.  Senior Resident: Dr. Jorge  Intern:  Dr. Juarez  Contact Number: [phone]    CHIEF COMPLAINT ON ADMISSION  Chief Complaint   Patient presents with    Fall     TBI activation. Pt had GLF while transferring from wheelchair to chair. - LOC. Pt right leg was bent behind her torso with her right foot up to her head. - thinners. Complaining of head pain and right shoulder pain. + CSM all extremities.      Reason for Admission  GLF -> Femur fracture    Admission Date  3/11/2025    CODE STATUS  DNAR/DNI    HPI & HOSPITAL COURSE    Jacqueline Rodriguez, an 84-year-old female with a pmhx of metastatic breast cancer, severe malnutrition, and age-related neurocognitive decline, presented to the emergency department after falling while transferring from her wheelchair to a chair. During the fall, she struck her head but did not lose consciousness. She reported a headache, neck pain, and right shoulder pain. Examination revealed a severe deformity of the right femur, for which she was placed in an air splint. She demonstrated speech difficulty and confusion, though she remained oriented to her name.    ED Course:   A CT scan of the head and neck was performed and showed no acute abnormalities, ruling out traumatic brain or cervical injury. X-ray confirmed a significant angulated fracture of the right femur, which was reduced under conscious sedation. A CT angiogram of the right lower extremity showed no significant arterial injury, although distal pulses were initially weak. Post-reduction, the patient reported some relief and was placed in traction with a 5-pound weight. Despite being a DNR, surgical intervention was anticipated to be beneficial, and orthopedics was consulted for fracture repair.    Hospital Course:     3/11/25:  Surgical fixation of the right femur shaft fracture was performed using an intramedullary nail.    3/12/25:  Overnight, the patient  experienced an episode of hypotension (BP 54/30) and was unresponsive after receiving morphine, consistent with her baseline ANO x 1 status. A 500 mL bolus of Ringer's lactate was administered, improving her blood pressure to 113/53. Patient was then returned to normal state of health and cognition. Cleared from ortho standpoint and will follow up in 2 weeks to have staples removed + ASA for 4 weeks.    Therefore, she is discharged in guarded and stable condition to hospice.    The patient met 2-midnight criteria for an inpatient stay at the time of discharge.    Discharge Date  03/12/25    Physical Exam on Day of Discharge  Physical Exam  Constitutional:       Appearance: She is ill-appearing.   HENT:      Head: Normocephalic.      Right Ear: External ear normal.      Left Ear: External ear normal.      Nose: Nose normal.      Mouth/Throat:      Mouth: Mucous membranes are moist.   Eyes:      Pupils: Pupils are equal, round, and reactive to light.   Cardiovascular:      Rate and Rhythm: Normal rate and regular rhythm.      Pulses: Normal pulses.      Heart sounds: Normal heart sounds.   Pulmonary:      Effort: Pulmonary effort is normal.      Breath sounds: Normal breath sounds.   Abdominal:      General: Abdomen is flat.   Musculoskeletal:         General: Tenderness present.      Cervical back: Normal range of motion.      Comments: S/p right femur repair, surgical bandage/cast in place.   Skin:     General: Skin is warm.      Capillary Refill: Capillary refill takes 2 to 3 seconds.      Coloration: Skin is pale.   Neurological:      Mental Status: Mental status is at baseline.   Psychiatric:         Thought Content: Thought content normal.         FOLLOW UP ITEMS POST DISCHARGE  Will return to hospice, follow-up with orthopedics in 1-2 weeks for staple removal and follow-up.    DISCHARGE DIAGNOSES  Principal Problem:    Closed displaced oblique fracture of shaft of right femur (HCC) (POA: Yes)  Active  Problems:    Age-related cognitive decline (POA: Yes)    Severe protein-calorie malnutrition (Merrill: less than 60% of standard weight) (HCC) (POA: Yes)    Breast cancer (HCC) (POA: Unknown)  Resolved Problems:    * No resolved hospital problems. *    FOLLOW UP  No future appointments.  No follow-up provider specified.    MEDICATIONS ON DISCHARGE     Medication List        ASK your doctor about these medications        Instructions   CRANBERRY PO   Take 1 Tablet by mouth every morning.  Dose: 1 Tablet     haloperidol 0.5 MG Tabs  Commonly known as: Haldol   Take 0.5 mg by mouth every 6 hours as needed. [facility]  Dose: 0.5 mg     morphine 15 MG tablet  Commonly known as: MS IR   Take 7.5 mg by mouth every four hours as needed for Severe Pain. 7.5 mg = 1/2 tab  [facility]  Dose: 7.5 mg     MULTI-VITAMIN GUMMIES PO  Ask about: Which instructions should I use?   Take 2 Tablets by mouth every morning.  Dose: 2 Tablet     traMADol 50 MG Tabs  Commonly known as: Ultram   Take 50 mg by mouth every 6 hours as needed for Mild Pain. [facility]  Dose: 50 mg            Allergies  No Known Allergies    DIET  Orders Placed This Encounter   Procedures    Diet NPO Restrict to: Sips with Medications     Standing Status:   Standing     Number of Occurrences:   1     Diet NPO Restrict to::   Sips with Medications [3]     ACTIVITY  As tolerated and directed by skilled nursing.  Non-weight bearing RIGHT leg    CONSULTATIONS  Orthopedic surgery    PROCEDURES  Right femur shaft fracture fixation with intramedullary nail (3/12/2025)    LABORATORY  Lab Results   Component Value Date    SODIUM 139 03/11/2025    POTASSIUM 3.6 03/11/2025    CHLORIDE 106 03/11/2025    CO2 24 03/11/2025    GLUCOSE 132 (H) 03/11/2025    BUN 27 (H) 03/11/2025    CREATININE 0.80 03/11/2025      Lab Results   Component Value Date    WBC 14.5 (H) 03/12/2025    HEMOGLOBIN 10.9 (L) 03/12/2025    HEMATOCRIT 33.4 (L) 03/12/2025    PLATELETCT 229  03/12/2025      Total time of the discharge process exceeds 31 minutes.

## (undated) DEVICE — DRESSING TRANSPARENT FILM TEGADERM 4 X 4.75" (50EA/BX)"

## (undated) DEVICE — SET LEADWIRE 5 LEAD BEDSIDE DISPOSABLE ECG (1SET OF 5/EA)

## (undated) DEVICE — DRAPE U ORTHOPEDIC - (10/BX)

## (undated) DEVICE — DRAPE 36X28IN RAD CARM BND BG - (25/CA)

## (undated) DEVICE — CANISTER SUCTION 3000ML MECHANICAL FILTER AUTO SHUTOFF MEDI-VAC NONSTERILE LF DISP (40EA/CA)

## (undated) DEVICE — DRAPE IOBAN II INCISE 23X17 - (10EA/BX 4BX/CA)

## (undated) DEVICE — BIT DRILL DIA2.6MM SCALED FOR VARIAX 2 WRIST FUSION LOCKING PLATE SYSTEM

## (undated) DEVICE — BAG SPONGE COUNT 10.25 X 32 - BLUE (250/CA)

## (undated) DEVICE — WRAP COBAN SELF-ADHERENT 6 IN X 5YDS STERILE TAN (12/CA)

## (undated) DEVICE — GOWN WARMING STANDARD FLEX - (30/CA)

## (undated) DEVICE — SUTURE 0 VICRYL PLUS CT-1 - 8 X 18 INCH (12/BX)

## (undated) DEVICE — SENSOR OXIMETER ADULT SPO2 RD SET (20EA/BX)

## (undated) DEVICE — PACK MAJOR ORTHO - (2EA/CA)

## (undated) DEVICE — STOCKINETTE IMPERVIOUS 12X48 - STERILELF (10/CA)"

## (undated) DEVICE — SUTURE GENERAL

## (undated) DEVICE — ELECTRODE DUAL RETURN W/ CORD - (50/PK)

## (undated) DEVICE — TUBING CLEARLINK DUO-VENT - C-FLO (48EA/CA)

## (undated) DEVICE — STAPLER SKIN DISP - (6/BX 10BX/CA) VISISTAT

## (undated) DEVICE — COVER LIGHT HANDLE ALC PLUS DISP (18EA/BX)

## (undated) DEVICE — PADDING CAST 6 IN STERILE - 6 X 4 YDS (24/CA)

## (undated) DEVICE — GLOVE SIZE 8.0 SURGEON ACCELERATOR FREE GREEN (50PR/BX)

## (undated) DEVICE — SUCTION INSTRUMENT YANKAUER BULBOUS TIP W/O VENT (50EA/CA)

## (undated) DEVICE — SLEEVE, VASO, THIGH, MED

## (undated) DEVICE — DRAPE C ARMOR (12EA/CA)

## (undated) DEVICE — GLOVE SZ 7.5 BIOGEL PI MICRO - PF LF (50PR/BX)

## (undated) DEVICE — LACTATED RINGERS INJ 1000 ML - (14EA/CA 60CA/PF)

## (undated) DEVICE — SUTURE 2-0 VICRYL PLUS CT-1 - 8 X 18 INCH(12/BX)

## (undated) DEVICE — BANDAGE ELASTIC STERILE MATRIX 6 X 10 (20EA/CA)

## (undated) DEVICE — DRAPE U SPLIT IMP 54 X 76 - (24/CA)

## (undated) DEVICE — GOWN SURGEONS X-LARGE - DISP. (30/CA)

## (undated) DEVICE — SET EXTENSION WITH 2 PORTS (48EA/CA) ***PART #2C8610 IS A SUBSTITUTE*****